# Patient Record
Sex: FEMALE | Race: WHITE | Employment: UNEMPLOYED | URBAN - METROPOLITAN AREA
[De-identification: names, ages, dates, MRNs, and addresses within clinical notes are randomized per-mention and may not be internally consistent; named-entity substitution may affect disease eponyms.]

---

## 2024-06-03 ENCOUNTER — HOSPITAL ENCOUNTER (OUTPATIENT)
Age: 39
Discharge: HOME | End: 2024-06-03

## 2024-06-04 ENCOUNTER — HOSPITAL ENCOUNTER (OUTPATIENT)
Dept: GENERAL RADIOLOGY | Age: 39
Discharge: HOME | End: 2024-06-04

## 2024-06-04 DIAGNOSIS — S99.921A INJURY OF TOE ON RIGHT FOOT, INITIAL ENCOUNTER: ICD-10-CM

## 2024-06-04 PROCEDURE — 73630 X-RAY EXAM OF FOOT: CPT

## 2024-06-11 RX ORDER — HYDROCHLOROTHIAZIDE 12.5 MG/1
12.5 TABLET ORAL DAILY
COMMUNITY

## 2024-06-11 RX ORDER — BUSPIRONE HYDROCHLORIDE 10 MG/1
10 TABLET ORAL 3 TIMES DAILY
COMMUNITY

## 2024-06-11 RX ORDER — HYDROCODONE BITARTRATE AND ACETAMINOPHEN 5; 325 MG/1; MG/1
1 TABLET ORAL EVERY 6 HOURS PRN
COMMUNITY

## 2024-06-14 ENCOUNTER — ANESTHESIA EVENT (OUTPATIENT)
Dept: OPERATING ROOM | Age: 39
End: 2024-06-14

## 2024-06-17 ENCOUNTER — HOSPITAL ENCOUNTER (OUTPATIENT)
Age: 39
Setting detail: OUTPATIENT SURGERY
Discharge: HOME | End: 2024-06-17
Attending: ORTHOPAEDIC SURGERY | Admitting: ORTHOPAEDIC SURGERY

## 2024-06-17 ENCOUNTER — ANESTHESIA (OUTPATIENT)
Dept: OPERATING ROOM | Age: 39
End: 2024-06-17

## 2024-06-17 ENCOUNTER — APPOINTMENT (OUTPATIENT)
Dept: GENERAL RADIOLOGY | Age: 39
End: 2024-06-17
Attending: ORTHOPAEDIC SURGERY

## 2024-06-17 VITALS
DIASTOLIC BLOOD PRESSURE: 72 MMHG | WEIGHT: 169 LBS | SYSTOLIC BLOOD PRESSURE: 118 MMHG | BODY MASS INDEX: 25.03 KG/M2 | HEIGHT: 69 IN | OXYGEN SATURATION: 100 % | HEART RATE: 58 BPM | RESPIRATION RATE: 13 BRPM | TEMPERATURE: 97.9 F

## 2024-06-17 PROBLEM — T84.84XA PAIN IN BONE FIXATION DEVICE, INITIAL ENCOUNTER (HCC): Status: ACTIVE | Noted: 2024-06-17

## 2024-06-17 LAB
ANION GAP SERPL CALCULATED.3IONS-SCNC: 10 MMOL/L (ref 3–16)
CALCIUM SERPL-MCNC: 8.7 MG/DL (ref 8.3–10.6)
CHLORIDE SERPL-SCNC: 105 MMOL/L (ref 99–110)
CO2 SERPL-SCNC: 21 MMOL/L (ref 21–32)
CREAT SERPL-MCNC: <0.5 MG/DL (ref 0.6–1.1)
GFR SERPLBLD CREATININE-BSD FMLA CKD-EPI: >90 ML/MIN/{1.73_M2}
GLUCOSE SERPL-MCNC: 92 MG/DL (ref 70–99)
HCG UR QL: NEGATIVE
POTASSIUM SERPL-SCNC: 3.8 MMOL/L (ref 3.5–5.1)
SODIUM SERPL-SCNC: 136 MMOL/L (ref 136–145)

## 2024-06-17 PROCEDURE — 3600000004 HC SURGERY LEVEL 4 BASE: Performed by: ORTHOPAEDIC SURGERY

## 2024-06-17 PROCEDURE — 7100000011 HC PHASE II RECOVERY - ADDTL 15 MIN: Performed by: ORTHOPAEDIC SURGERY

## 2024-06-17 PROCEDURE — 7100000000 HC PACU RECOVERY - FIRST 15 MIN: Performed by: ORTHOPAEDIC SURGERY

## 2024-06-17 PROCEDURE — 6360000002 HC RX W HCPCS: Performed by: ORTHOPAEDIC SURGERY

## 2024-06-17 PROCEDURE — 80048 BASIC METABOLIC PNL TOTAL CA: CPT

## 2024-06-17 PROCEDURE — 6370000000 HC RX 637 (ALT 250 FOR IP): Performed by: ANESTHESIOLOGY

## 2024-06-17 PROCEDURE — 6360000002 HC RX W HCPCS: Performed by: NURSE ANESTHETIST, CERTIFIED REGISTERED

## 2024-06-17 PROCEDURE — 7100000010 HC PHASE II RECOVERY - FIRST 15 MIN: Performed by: ORTHOPAEDIC SURGERY

## 2024-06-17 PROCEDURE — 3700000000 HC ANESTHESIA ATTENDED CARE: Performed by: ORTHOPAEDIC SURGERY

## 2024-06-17 PROCEDURE — 73630 X-RAY EXAM OF FOOT: CPT

## 2024-06-17 PROCEDURE — 84703 CHORIONIC GONADOTROPIN ASSAY: CPT

## 2024-06-17 PROCEDURE — 2580000003 HC RX 258: Performed by: ORTHOPAEDIC SURGERY

## 2024-06-17 PROCEDURE — 7100000001 HC PACU RECOVERY - ADDTL 15 MIN: Performed by: ORTHOPAEDIC SURGERY

## 2024-06-17 PROCEDURE — 2709999900 HC NON-CHARGEABLE SUPPLY: Performed by: ORTHOPAEDIC SURGERY

## 2024-06-17 PROCEDURE — 3600000014 HC SURGERY LEVEL 4 ADDTL 15MIN: Performed by: ORTHOPAEDIC SURGERY

## 2024-06-17 PROCEDURE — 2580000003 HC RX 258: Performed by: ANESTHESIOLOGY

## 2024-06-17 PROCEDURE — 3700000001 HC ADD 15 MINUTES (ANESTHESIA): Performed by: ORTHOPAEDIC SURGERY

## 2024-06-17 PROCEDURE — 2500000003 HC RX 250 WO HCPCS: Performed by: NURSE ANESTHETIST, CERTIFIED REGISTERED

## 2024-06-17 PROCEDURE — A4217 STERILE WATER/SALINE, 500 ML: HCPCS | Performed by: ORTHOPAEDIC SURGERY

## 2024-06-17 PROCEDURE — 6360000002 HC RX W HCPCS: Performed by: ANESTHESIOLOGY

## 2024-06-17 RX ORDER — NALOXONE HYDROCHLORIDE 0.4 MG/ML
INJECTION, SOLUTION INTRAMUSCULAR; INTRAVENOUS; SUBCUTANEOUS PRN
Status: DISCONTINUED | OUTPATIENT
Start: 2024-06-17 | End: 2024-06-17 | Stop reason: HOSPADM

## 2024-06-17 RX ORDER — SODIUM CHLORIDE 0.9 % (FLUSH) 0.9 %
5-40 SYRINGE (ML) INJECTION PRN
Status: DISCONTINUED | OUTPATIENT
Start: 2024-06-17 | End: 2024-06-17 | Stop reason: HOSPADM

## 2024-06-17 RX ORDER — DEXAMETHASONE SODIUM PHOSPHATE 4 MG/ML
INJECTION, SOLUTION INTRA-ARTICULAR; INTRALESIONAL; INTRAMUSCULAR; INTRAVENOUS; SOFT TISSUE PRN
Status: DISCONTINUED | OUTPATIENT
Start: 2024-06-17 | End: 2024-06-17 | Stop reason: SDUPTHER

## 2024-06-17 RX ORDER — FENTANYL CITRATE 50 UG/ML
INJECTION, SOLUTION INTRAMUSCULAR; INTRAVENOUS PRN
Status: DISCONTINUED | OUTPATIENT
Start: 2024-06-17 | End: 2024-06-17 | Stop reason: SDUPTHER

## 2024-06-17 RX ORDER — SODIUM CHLORIDE 0.9 % (FLUSH) 0.9 %
5-40 SYRINGE (ML) INJECTION EVERY 12 HOURS SCHEDULED
Status: DISCONTINUED | OUTPATIENT
Start: 2024-06-17 | End: 2024-06-17 | Stop reason: HOSPADM

## 2024-06-17 RX ORDER — PROCHLORPERAZINE EDISYLATE 5 MG/ML
5 INJECTION INTRAMUSCULAR; INTRAVENOUS
Status: DISCONTINUED | OUTPATIENT
Start: 2024-06-17 | End: 2024-06-17 | Stop reason: HOSPADM

## 2024-06-17 RX ORDER — OXYCODONE HYDROCHLORIDE 5 MG/1
5 TABLET ORAL
Status: DISCONTINUED | OUTPATIENT
Start: 2024-06-17 | End: 2024-06-17 | Stop reason: HOSPADM

## 2024-06-17 RX ORDER — MIDAZOLAM HYDROCHLORIDE 1 MG/ML
INJECTION INTRAMUSCULAR; INTRAVENOUS PRN
Status: DISCONTINUED | OUTPATIENT
Start: 2024-06-17 | End: 2024-06-17 | Stop reason: SDUPTHER

## 2024-06-17 RX ORDER — SODIUM CHLORIDE 9 MG/ML
INJECTION, SOLUTION INTRAVENOUS PRN
Status: DISCONTINUED | OUTPATIENT
Start: 2024-06-17 | End: 2024-06-17 | Stop reason: HOSPADM

## 2024-06-17 RX ORDER — TRAMADOL HYDROCHLORIDE 50 MG/1
100 TABLET ORAL ONCE
Status: COMPLETED | OUTPATIENT
Start: 2024-06-17 | End: 2024-06-17

## 2024-06-17 RX ORDER — IPRATROPIUM BROMIDE AND ALBUTEROL SULFATE 2.5; .5 MG/3ML; MG/3ML
1 SOLUTION RESPIRATORY (INHALATION)
Status: DISCONTINUED | OUTPATIENT
Start: 2024-06-17 | End: 2024-06-17 | Stop reason: HOSPADM

## 2024-06-17 RX ORDER — MAGNESIUM HYDROXIDE 1200 MG/15ML
LIQUID ORAL CONTINUOUS PRN
Status: COMPLETED | OUTPATIENT
Start: 2024-06-17 | End: 2024-06-17

## 2024-06-17 RX ORDER — MEPERIDINE HYDROCHLORIDE 50 MG/ML
12.5 INJECTION INTRAMUSCULAR; INTRAVENOUS; SUBCUTANEOUS EVERY 5 MIN PRN
Status: DISCONTINUED | OUTPATIENT
Start: 2024-06-17 | End: 2024-06-17 | Stop reason: HOSPADM

## 2024-06-17 RX ORDER — PROPOFOL 10 MG/ML
INJECTION, EMULSION INTRAVENOUS PRN
Status: DISCONTINUED | OUTPATIENT
Start: 2024-06-17 | End: 2024-06-17 | Stop reason: SDUPTHER

## 2024-06-17 RX ORDER — OXYCODONE HYDROCHLORIDE 5 MG/1
10 TABLET ORAL PRN
Status: DISCONTINUED | OUTPATIENT
Start: 2024-06-17 | End: 2024-06-17 | Stop reason: HOSPADM

## 2024-06-17 RX ORDER — ONDANSETRON 2 MG/ML
INJECTION INTRAMUSCULAR; INTRAVENOUS PRN
Status: DISCONTINUED | OUTPATIENT
Start: 2024-06-17 | End: 2024-06-17 | Stop reason: SDUPTHER

## 2024-06-17 RX ORDER — LIDOCAINE HYDROCHLORIDE 10 MG/ML
0.3 INJECTION, SOLUTION EPIDURAL; INFILTRATION; INTRACAUDAL; PERINEURAL
Status: DISCONTINUED | OUTPATIENT
Start: 2024-06-17 | End: 2024-06-17 | Stop reason: HOSPADM

## 2024-06-17 RX ORDER — LIDOCAINE HYDROCHLORIDE 20 MG/ML
INJECTION, SOLUTION EPIDURAL; INFILTRATION; INTRACAUDAL; PERINEURAL PRN
Status: DISCONTINUED | OUTPATIENT
Start: 2024-06-17 | End: 2024-06-17 | Stop reason: SDUPTHER

## 2024-06-17 RX ORDER — CLINDAMYCIN PHOSPHATE 900 MG/50ML
900 INJECTION, SOLUTION INTRAVENOUS
Status: COMPLETED | OUTPATIENT
Start: 2024-06-17 | End: 2024-06-17

## 2024-06-17 RX ORDER — BUPIVACAINE HYDROCHLORIDE 5 MG/ML
INJECTION, SOLUTION EPIDURAL; INTRACAUDAL PRN
Status: DISCONTINUED | OUTPATIENT
Start: 2024-06-17 | End: 2024-06-17 | Stop reason: ALTCHOICE

## 2024-06-17 RX ORDER — SODIUM CHLORIDE, SODIUM LACTATE, POTASSIUM CHLORIDE, CALCIUM CHLORIDE 600; 310; 30; 20 MG/100ML; MG/100ML; MG/100ML; MG/100ML
INJECTION, SOLUTION INTRAVENOUS CONTINUOUS
Status: DISCONTINUED | OUTPATIENT
Start: 2024-06-17 | End: 2024-06-17 | Stop reason: HOSPADM

## 2024-06-17 RX ORDER — ACETAMINOPHEN 500 MG
1000 TABLET ORAL ONCE
Status: COMPLETED | OUTPATIENT
Start: 2024-06-17 | End: 2024-06-17

## 2024-06-17 RX ORDER — ONDANSETRON 2 MG/ML
4 INJECTION INTRAMUSCULAR; INTRAVENOUS
Status: DISCONTINUED | OUTPATIENT
Start: 2024-06-17 | End: 2024-06-17 | Stop reason: HOSPADM

## 2024-06-17 RX ADMIN — ACETAMINOPHEN 1000 MG: 500 TABLET ORAL at 12:37

## 2024-06-17 RX ADMIN — MIDAZOLAM 2 MG: 1 INJECTION INTRAMUSCULAR; INTRAVENOUS at 12:52

## 2024-06-17 RX ADMIN — TRAMADOL HYDROCHLORIDE 100 MG: 50 TABLET ORAL at 12:36

## 2024-06-17 RX ADMIN — LIDOCAINE HYDROCHLORIDE 80 MG: 20 INJECTION, SOLUTION EPIDURAL; INFILTRATION; INTRACAUDAL; PERINEURAL at 12:58

## 2024-06-17 RX ADMIN — HYDROMORPHONE HYDROCHLORIDE 0.5 MG: 1 INJECTION, SOLUTION INTRAMUSCULAR; INTRAVENOUS; SUBCUTANEOUS at 13:54

## 2024-06-17 RX ADMIN — DEXAMETHASONE SODIUM PHOSPHATE 4 MG: 4 INJECTION, SOLUTION INTRAMUSCULAR; INTRAVENOUS at 13:11

## 2024-06-17 RX ADMIN — OXYCODONE 10 MG: 5 TABLET ORAL at 14:12

## 2024-06-17 RX ADMIN — FENTANYL CITRATE 50 MCG: 50 INJECTION, SOLUTION INTRAMUSCULAR; INTRAVENOUS at 13:27

## 2024-06-17 RX ADMIN — HYDROMORPHONE HYDROCHLORIDE 0.5 MG: 1 INJECTION, SOLUTION INTRAMUSCULAR; INTRAVENOUS; SUBCUTANEOUS at 14:21

## 2024-06-17 RX ADMIN — PROPOFOL 200 MG: 10 INJECTION, EMULSION INTRAVENOUS at 12:58

## 2024-06-17 RX ADMIN — FENTANYL CITRATE 50 MCG: 50 INJECTION, SOLUTION INTRAMUSCULAR; INTRAVENOUS at 13:08

## 2024-06-17 RX ADMIN — HYDROMORPHONE HYDROCHLORIDE 0.5 MG: 1 INJECTION, SOLUTION INTRAMUSCULAR; INTRAVENOUS; SUBCUTANEOUS at 14:05

## 2024-06-17 RX ADMIN — SODIUM CHLORIDE, SODIUM LACTATE, POTASSIUM CHLORIDE, AND CALCIUM CHLORIDE: .6; .31; .03; .02 INJECTION, SOLUTION INTRAVENOUS at 12:25

## 2024-06-17 RX ADMIN — ONDANSETRON 4 MG: 2 INJECTION INTRAMUSCULAR; INTRAVENOUS at 13:11

## 2024-06-17 RX ADMIN — Medication 2 AMPULE: at 12:30

## 2024-06-17 RX ADMIN — CLINDAMYCIN PHOSPHATE 900 MG: 900 INJECTION, SOLUTION INTRAVENOUS at 13:00

## 2024-06-17 ASSESSMENT — PAIN SCALES - GENERAL
PAINLEVEL_OUTOF10: 8
PAINLEVEL_OUTOF10: 8
PAINLEVEL_OUTOF10: 9
PAINLEVEL_OUTOF10: 9

## 2024-06-17 ASSESSMENT — PAIN DESCRIPTION - ORIENTATION
ORIENTATION: RIGHT

## 2024-06-17 ASSESSMENT — PAIN DESCRIPTION - LOCATION
LOCATION: FOOT

## 2024-06-17 ASSESSMENT — LIFESTYLE VARIABLES: SMOKING_STATUS: 1

## 2024-06-17 NOTE — DISCHARGE INSTRUCTIONS
Ice and elevate involved lower extremity  Heel Wt bearing involved lower extremity with boot on  Keep clean and dry  Meds:take pain meds and antibiotics as directed. Both were called into pharmacy from the office on 6/14/24  Take 325 mg of aspirin daily as long as you have no problems with your stomach  Follow up in 10-14 days with Dr. Gupta. Call for appt [phone]  Loosen Ace PRN  Can have shoe off when sitting but must wear when walking and in bed.  Call for temp > 101.0 shortness of breath or uncontrolled pain

## 2024-06-17 NOTE — BRIEF OP NOTE
Brief Postoperative Note      Patient: Jania Patel  YOB: 1985  MRN: [id number]    Date of Procedure: 6/17/2024    Pre-Op Diagnosis Codes:     * Pain in bone fixation device, initial encounter ([facility]) [T84.84XA]    Post-Op Diagnosis: Same       Procedure(s):  RIGHT MIDFOOT HARDWARE REMOVAL    Surgeon(s):  Lalo Gupta MD    Assistant:  Surgical Assistant: Lalo Guerra RN  Physician Assistant: Tello Al PA-C    Anesthesia: General    Estimated Blood Loss (mL): Minimal    Complications: None    Specimens:   * No specimens in log *    Implants:  * No implants in log *      Drains: * No LDAs found *    Findings:  Infection Present At Time Of Surgery (PATOS) (choose all levels that have infection present):  No infection present  Other Findings: no great toe new fracture    Electronically signed by Lalo Gupta MD on 6/17/2024 at 1:28 PM

## 2024-06-17 NOTE — ANESTHESIA PRE PROCEDURE
Started at age 12      Vital Signs (Current):   Vitals:    06/11/24 1624   Weight: 76.7 kg (169 lb)   Height: 1.753 m (5' 9\")                                              BP Readings from Last 3 Encounters:   01/15/23 118/66   09/21/22 130/84   03/08/21 106/67       NPO Status:                                                                                 BMI:   Wt Readings from Last 3 Encounters:   06/11/24 76.7 kg (169 lb)   01/15/23 77.1 kg (170 lb)   09/21/22 82 kg (180 lb 12.8 oz)     Body mass index is 24.96 kg/m².    CBC:   Lab Results   Component Value Date/Time    WBC 3.9 09/21/2022 04:32 AM    RBC 2.94 09/21/2022 04:32 AM    HGB 9.7 09/21/2022 04:32 AM    HCT 28.9 09/21/2022 04:32 AM    MCV 98.5 09/21/2022 04:32 AM    RDW 16.0 09/21/2022 04:32 AM     09/21/2022 04:32 AM       CMP:   Lab Results   Component Value Date/Time     09/21/2022 04:32 AM    K 3.6 09/21/2022 04:32 AM     09/21/2022 04:32 AM    CO2 21 09/21/2022 04:32 AM    BUN 7 09/21/2022 04:32 AM    CREATININE <0.5 09/21/2022 04:32 AM    GFRAA >60 09/21/2022 04:32 AM    AGRATIO 1.3 09/21/2022 04:32 AM    LABGLOM >60 09/21/2022 04:32 AM    GLUCOSE 99 09/21/2022 04:32 AM    CALCIUM 7.9 09/21/2022 04:32 AM    BILITOT 0.3 09/21/2022 04:32 AM    ALKPHOS 45 09/21/2022 04:32 AM    AST 13 09/21/2022 04:32 AM    ALT 8 09/21/2022 04:32 AM       POC Tests: No results for input(s): \"POCGLU\", \"POCNA\", \"POCK\", \"POCCL\", \"POCBUN\", \"POCHEMO\", \"POCHCT\" in the last 72 hours.    Coags:   Lab Results   Component Value Date/Time    PROTIME 10.6 08/12/2021 05:06 PM    INR 0.94 08/12/2021 05:06 PM       HCG (If Applicable):   Lab Results   Component Value Date    PREGTESTUR Negative 06/17/2024    PREGSERUM Negative 09/20/2022    HCGQUANT <5.0 09/21/2022        ABGs: No results found for: \"PHART\", \"PO2ART\", \"OON4IYA\", \"ZRR3YWS\", \"BEART\", \"N9LPGANA\"     Type & Screen (If Applicable):  No results found for: \"LABABO\"    Drug/Infectious Status (If

## 2024-06-17 NOTE — PROGRESS NOTES
Patient admitted to [facility] room 3 in preparation for surgery, VSS. Consent confirmed. IV inserted into left hand, LR infusing. Belongings on [facility] cart. NPO since 2000. Family at bedside, phone number in system for text updates, call light within reach.    
Pt brought to PACU. Report obtained from OR RN and anesthesia. Pt placed on monitor and O2 at  2L   
Pt up to the bathroom to void without difficulty. Discharge instructions given to pt and family. Verbalized understanding. PIV removed. Pt dressed and wheeled out and discharged to the care of their family in stable condition.    
_________________________  (__) C-REACTIVE PROTEIN ___________    (__) VITAMIN D HYDROXY ___________  (__) BETABLOCKER  _________________                                                                                       (__) ACE/ARBS:__________________________    (__) GLP-1 Agonist ___________________                Ride home/Contact #_______________________   (__) SCLT2 inhibitor ___________________         
going to the OR; we will provide a container.  If you wear contact lenses or glasses, they will be removed,               bring a case for them or wear glasses day of surgery.             - Please see your family doctor/pediatrician for a Preop History & Physical and/or concerning medications within 30 days of the surgery date.                  Non-[facility] physicians can fax H&P/test results to [phone]. You may need cardiac clearance if you see a cardiologist, check with PCP or surgeon.              -If you have a Living Will and Durable Power of  for Healthcare, please bring in a copy to be scanned at registration.              -Notify your Surgeon if you develop any illness between now and the surgery date, cough, cold, fever, sore throat, nausea, vomiting, etc.  Please notify your                surgeon if you experience dizziness, shortness of breath or blurred vision between now & the time of your surgery.              -DO NOT shave your operative site less than 4 days prior to surgery. For face & neck surgery, men may use an electric razor up to 2 days prior to surgery.   -To help prevent infection, change your sheets the night before surgery. Also, shower the night before & morning of surgery using an antibacterial     soap (Dial or Safeguard) or Hibiclens soap as instructed by your surgeon. Do not apply lotion after shower or day of surgery.              -To provide excellent care visitors will be limited to two per room at any given time.              -Please bring your picture ID and insurance card for registration prior to arriving to second floor surgery department.              -If you use a CPAP/BiPAP please bring with you on the day of the surgery. If you use oxygen, please bring portable tank with you.              -For your convenience [facility] has an outpatient pharmacy on site to fill your prescriptions prior to 5 pm.              -Bring a complete list of all your medications with name and

## 2024-06-17 NOTE — ANESTHESIA POSTPROCEDURE EVALUATION
Department of Anesthesiology  Postprocedure Note    Patient: Jania Patel  MRN: [id number]  YOB: 1985  Date of evaluation: 6/17/2024    Procedure Summary       Date: 06/17/24 Room / Location: [address]    Anesthesia Start: 1254 Anesthesia Stop: 1355    Procedure: RIGHT MIDFOOT HARDWARE REMOVAL (Right: Foot) Diagnosis:       Pain in bone fixation device, initial encounter ([facility])      (Pain in bone fixation device, initial encounter ([facility]) [T84.84XA])    Surgeons: Lalo Gupta MD Responsible Provider: Garry Kimball MD    Anesthesia Type: general ASA Status: 2            Anesthesia Type: No value filed.    Sudhir Phase I: Sudhir Score: 10    Sudhir Phase II:      Anesthesia Post Evaluation    Patient location during evaluation: PACU  Patient participation: complete - patient participated  Level of consciousness: awake and alert  Airway patency: patent  Nausea & Vomiting: no nausea and no vomiting  Cardiovascular status: hemodynamically stable  Respiratory status: acceptable  Hydration status: euvolemic  Pain management: adequate    No notable events documented.

## 2024-06-18 NOTE — OP NOTE
[address] [address]                            OPERATIVE REPORT      PATIENT NAME: NADJA ELLIS            : 1985  MED REC NO: [id number]                      ROOM: [facility]  ACCOUNT NO: [id number]                       ADMIT DATE: 2024  PROVIDER: Lalo Gupta MD      DATE OF PROCEDURE:  2024    SURGEON:  Lalo Gupta MD    PREOPERATIVE DIAGNOSIS:  Retained painful orthopedic hardware, right foot.    POSTOPERATIVE DIAGNOSIS:  Retained painful orthopedic hardware, right foot.    OPERATION PERFORMED:  Removal of painful hardware, right foot.    ASSISTANT SURGEON:  DOT Yañez    DRAINS:  None.    TUBES:  None.    SPECIMENS:  None.    ESTIMATED BLOOD LOSS:  Less than 25 mL.    TOURNIQUET TIME:  Less than 30 minutes.    INDICATIONS:  The patient is a 39-year-old female who had previous Lisfranc injury, underwent bridged stapling and internal screw fixation of her Lisfranc fracture dislocation.  She would like her hardware removed.  The staples were broken.  She understands that the tines that are in the bone would not be removed.  Risks and benefits of surgery were explained to the patient.  Informed consent was signed in the chart prior to surgery.    DESCRIPTION OF PROCEDURE:  Patient was brought to the operating room and after adequate general anesthesia and block, was placed in supine position.  A nonsterile tourniquet was placed on the proximal aspect of her right upper thigh.  Her right lower extremity prepped and draped in sterile fashion.  Leg was exsanguinated and tourniquet inflated to 300 mmHg.  At this point, a transverse incision was made over the medial aspect of the foot through her previous medial incision.  It was carried down through the subcutaneous tissue using Metzenbaum scissors.  The plantar medial staple was easily identified, cleaned of soft tissue with care taken not to damage the dorsal

## 2024-12-11 ENCOUNTER — APPOINTMENT (OUTPATIENT)
Dept: GENERAL RADIOLOGY | Age: 39
End: 2024-12-11
Payer: MEDICAID

## 2024-12-11 ENCOUNTER — ANCILLARY PROCEDURE (OUTPATIENT)
Dept: CARDIOLOGY CLINIC | Age: 39
End: 2024-12-11

## 2024-12-11 ENCOUNTER — HOSPITAL ENCOUNTER (EMERGENCY)
Age: 39
Discharge: HOME | End: 2024-12-11
Attending: STUDENT IN AN ORGANIZED HEALTH CARE EDUCATION/TRAINING PROGRAM
Payer: MEDICAID

## 2024-12-11 ENCOUNTER — APPOINTMENT (OUTPATIENT)
Dept: CT IMAGING | Age: 39
End: 2024-12-11
Attending: STUDENT IN AN ORGANIZED HEALTH CARE EDUCATION/TRAINING PROGRAM
Payer: MEDICAID

## 2024-12-11 VITALS
SYSTOLIC BLOOD PRESSURE: 110 MMHG | OXYGEN SATURATION: 100 % | DIASTOLIC BLOOD PRESSURE: 70 MMHG | HEART RATE: 51 BPM | TEMPERATURE: 98.8 F | RESPIRATION RATE: 14 BRPM | WEIGHT: 162.8 LBS | BODY MASS INDEX: 24.04 KG/M2

## 2024-12-11 DIAGNOSIS — R55 PRE-SYNCOPE: ICD-10-CM

## 2024-12-11 DIAGNOSIS — R07.9 CHEST PAIN, UNSPECIFIED TYPE: Primary | ICD-10-CM

## 2024-12-11 DIAGNOSIS — R00.2 PALPITATIONS: ICD-10-CM

## 2024-12-11 DIAGNOSIS — R42 LIGHTHEADED: ICD-10-CM

## 2024-12-11 LAB
ALBUMIN SERPL-MCNC: 3.9 G/DL (ref 3.4–5)
ALBUMIN/GLOB SERPL: 1.6 {RATIO} (ref 1.1–2.2)
ALP SERPL-CCNC: 39 U/L (ref 40–129)
ALT SERPL-CCNC: 9 U/L (ref 10–40)
ANION GAP SERPL CALCULATED.3IONS-SCNC: 12 MMOL/L (ref 3–16)
AST SERPL-CCNC: 16 U/L (ref 15–37)
BASOPHILS # BLD: 0 K/UL (ref 0–0.2)
BASOPHILS NFR BLD: 0.6 %
BILIRUB SERPL-MCNC: 0.7 MG/DL (ref 0–1)
BUN SERPL-MCNC: 12 MG/DL (ref 7–20)
CALCIUM SERPL-MCNC: 8.8 MG/DL (ref 8.3–10.6)
CHLORIDE SERPL-SCNC: 109 MMOL/L (ref 99–110)
CO2 SERPL-SCNC: 19 MMOL/L (ref 21–32)
CREAT SERPL-MCNC: 0.8 MG/DL (ref 0.6–1.1)
DEPRECATED RDW RBC AUTO: 14.8 % (ref 12.4–15.4)
EKG ATRIAL RATE: 57 BPM
EKG DIAGNOSIS: NORMAL
EKG P AXIS: 39 DEGREES
EKG P-R INTERVAL: 102 MS
EKG Q-T INTERVAL: 436 MS
EKG QRS DURATION: 88 MS
EKG QTC CALCULATION (BAZETT): 424 MS
EKG R AXIS: 68 DEGREES
EKG T AXIS: 53 DEGREES
EKG VENTRICULAR RATE: 57 BPM
EOSINOPHIL # BLD: 0.2 K/UL (ref 0–0.6)
EOSINOPHIL NFR BLD: 2.2 %
ETHANOLAMINE SERPL-MCNC: NORMAL MG/DL (ref 0–0.08)
GFR SERPLBLD CREATININE-BSD FMLA CKD-EPI: >90 ML/MIN/{1.73_M2}
GLUCOSE SERPL-MCNC: 87 MG/DL (ref 70–99)
HCG SERPL QL: NEGATIVE
HCT VFR BLD AUTO: 44.5 % (ref 36–48)
HGB BLD-MCNC: 14.3 G/DL (ref 12–16)
LIPASE SERPL-CCNC: 30 U/L (ref 13–60)
LYMPHOCYTES # BLD: 2.1 K/UL (ref 1–5.1)
LYMPHOCYTES NFR BLD: 28.5 %
MCH RBC QN AUTO: 32.7 PG (ref 26–34)
MCHC RBC AUTO-ENTMCNC: 32.2 G/DL (ref 31–36)
MCV RBC AUTO: 101.7 FL (ref 80–100)
MONOCYTES # BLD: 0.4 K/UL (ref 0–1.3)
MONOCYTES NFR BLD: 6 %
NEUTROPHILS # BLD: 4.7 K/UL (ref 1.7–7.7)
NEUTROPHILS NFR BLD: 62.7 %
PLATELET # BLD AUTO: 200 K/UL (ref 135–450)
PMV BLD AUTO: 8.3 FL (ref 5–10.5)
POTASSIUM SERPL-SCNC: 4.3 MMOL/L (ref 3.5–5.1)
PROT SERPL-MCNC: 6.4 G/DL (ref 6.4–8.2)
RBC # BLD AUTO: 4.38 M/UL (ref 4–5.2)
SODIUM SERPL-SCNC: 140 MMOL/L (ref 136–145)
TROPONIN, HIGH SENSITIVITY: <6 NG/L (ref 0–14)
TROPONIN, HIGH SENSITIVITY: <6 NG/L (ref 0–14)
WBC # BLD AUTO: 7.4 K/UL (ref 4–11)

## 2024-12-11 PROCEDURE — 6360000004 HC RX CONTRAST MEDICATION: Performed by: STUDENT IN AN ORGANIZED HEALTH CARE EDUCATION/TRAINING PROGRAM

## 2024-12-11 PROCEDURE — 96374 THER/PROPH/DIAG INJ IV PUSH: CPT

## 2024-12-11 PROCEDURE — 93010 ELECTROCARDIOGRAM REPORT: CPT | Performed by: INTERNAL MEDICINE

## 2024-12-11 PROCEDURE — 84703 CHORIONIC GONADOTROPIN ASSAY: CPT

## 2024-12-11 PROCEDURE — 80053 COMPREHEN METABOLIC PANEL: CPT

## 2024-12-11 PROCEDURE — 2580000003 HC RX 258: Performed by: STUDENT IN AN ORGANIZED HEALTH CARE EDUCATION/TRAINING PROGRAM

## 2024-12-11 PROCEDURE — 99285 EMERGENCY DEPT VISIT HI MDM: CPT

## 2024-12-11 PROCEDURE — 6360000002 HC RX W HCPCS: Performed by: STUDENT IN AN ORGANIZED HEALTH CARE EDUCATION/TRAINING PROGRAM

## 2024-12-11 PROCEDURE — 96375 TX/PRO/DX INJ NEW DRUG ADDON: CPT

## 2024-12-11 PROCEDURE — 93005 ELECTROCARDIOGRAM TRACING: CPT | Performed by: STUDENT IN AN ORGANIZED HEALTH CARE EDUCATION/TRAINING PROGRAM

## 2024-12-11 PROCEDURE — 84484 ASSAY OF TROPONIN QUANT: CPT

## 2024-12-11 PROCEDURE — 71260 CT THORAX DX C+: CPT

## 2024-12-11 PROCEDURE — 82077 ASSAY SPEC XCP UR&BREATH IA: CPT

## 2024-12-11 PROCEDURE — 83690 ASSAY OF LIPASE: CPT

## 2024-12-11 PROCEDURE — 85025 COMPLETE CBC W/AUTO DIFF WBC: CPT

## 2024-12-11 PROCEDURE — 96361 HYDRATE IV INFUSION ADD-ON: CPT

## 2024-12-11 PROCEDURE — 74177 CT ABD & PELVIS W/CONTRAST: CPT

## 2024-12-11 PROCEDURE — 36415 COLL VENOUS BLD VENIPUNCTURE: CPT

## 2024-12-11 PROCEDURE — 71046 X-RAY EXAM CHEST 2 VIEWS: CPT

## 2024-12-11 RX ORDER — IOPAMIDOL 755 MG/ML
75 INJECTION, SOLUTION INTRAVASCULAR
Status: COMPLETED | OUTPATIENT
Start: 2024-12-11 | End: 2024-12-11

## 2024-12-11 RX ORDER — ONDANSETRON 2 MG/ML
4 INJECTION INTRAMUSCULAR; INTRAVENOUS ONCE
Status: COMPLETED | OUTPATIENT
Start: 2024-12-11 | End: 2024-12-11

## 2024-12-11 RX ORDER — 0.9 % SODIUM CHLORIDE 0.9 %
1000 INTRAVENOUS SOLUTION INTRAVENOUS ONCE
Status: COMPLETED | OUTPATIENT
Start: 2024-12-11 | End: 2024-12-11

## 2024-12-11 RX ORDER — MORPHINE SULFATE 4 MG/ML
4 INJECTION, SOLUTION INTRAMUSCULAR; INTRAVENOUS ONCE
Status: COMPLETED | OUTPATIENT
Start: 2024-12-11 | End: 2024-12-11

## 2024-12-11 RX ADMIN — IOPAMIDOL 75 ML: 755 INJECTION, SOLUTION INTRAVENOUS at 12:14

## 2024-12-11 RX ADMIN — MORPHINE SULFATE 4 MG: 4 INJECTION, SOLUTION INTRAMUSCULAR; INTRAVENOUS at 11:04

## 2024-12-11 RX ADMIN — ONDANSETRON 4 MG: 2 INJECTION, SOLUTION INTRAMUSCULAR; INTRAVENOUS at 11:03

## 2024-12-11 RX ADMIN — SODIUM CHLORIDE 1000 ML: 9 INJECTION, SOLUTION INTRAVENOUS at 11:03

## 2024-12-11 ASSESSMENT — ENCOUNTER SYMPTOMS
ABDOMINAL PAIN: 1
SHORTNESS OF BREATH: 1
NAUSEA: 1
VOMITING: 1

## 2024-12-11 ASSESSMENT — PAIN - FUNCTIONAL ASSESSMENT
PAIN_FUNCTIONAL_ASSESSMENT: 0-10
PAIN_FUNCTIONAL_ASSESSMENT: NONE - DENIES PAIN

## 2024-12-11 ASSESSMENT — PAIN DESCRIPTION - LOCATION
LOCATION: CHEST
LOCATION: CHEST

## 2024-12-11 ASSESSMENT — PAIN DESCRIPTION - PAIN TYPE: TYPE: ACUTE PAIN

## 2024-12-11 ASSESSMENT — PAIN SCALES - GENERAL
PAINLEVEL_OUTOF10: 6
PAINLEVEL_OUTOF10: 6

## 2024-12-11 ASSESSMENT — PAIN DESCRIPTION - DESCRIPTORS
DESCRIPTORS: DISCOMFORT
DESCRIPTORS: DULL;SHARP

## 2024-12-11 ASSESSMENT — PAIN DESCRIPTION - ORIENTATION: ORIENTATION: MID

## 2024-12-11 NOTE — ED PROVIDER NOTES
she will need to come back to get one placed tomorrow.  She is amenable with plan    - Return precautions also discussed.  patient verbalized understanding of care plan and agreed to follow-up with cardiology as advised.          Clinical Impression:  1. Chest pain, unspecified type    2. Pre-syncope    3. Lightheaded    4. Palpitations          Disposition:  Discharge to home in good condition.    Blood pressure 104/63, pulse 56, temperature 98.8 °F (37.1 °C), temperature source Oral, resp. rate 14, weight 73.8 kg (162 lb 12.8 oz), SpO2 100%, unknown if currently breastfeeding.    Patient was given scripts for the following medications. I counseled patient how to take these medications.   New Prescriptions    No medications on file       Disposition referral (if applicable):  [facility]  [address] [facility] [address] [phone]        Kyleigh Hedrick, APRN - [facility]  [address]  [address]  [facility] [address]  [phone]              Total critical care time is 0 minutes, which excludes separately billable procedures and updating family. Time spent is specifically for management of the presenting complaint and symptoms initially, direct bedside care, reevaluation, review of records, and consultation.  There was a high probability of clinically significant life-threatening deterioration in the patient's condition, which required my urgent intervention.     This chart was generated in part by using Dragon Dictation system and may contain errors related to that system including errors in grammar, punctuation, and spelling, as well as words and phrases that may be inappropriate. If there are any questions or concerns please feel free to contact the dictating provider for clarification.     Jennifer Golden MD   [employer]        Jennifer Golden MD  12/11/24 2863

## 2024-12-17 ENCOUNTER — TELEPHONE (OUTPATIENT)
Dept: CARDIOLOGY CLINIC | Age: 39
End: 2024-12-17

## 2024-12-17 ENCOUNTER — ANCILLARY PROCEDURE (OUTPATIENT)
Dept: CARDIOLOGY CLINIC | Age: 39
End: 2024-12-17

## 2024-12-17 DIAGNOSIS — R00.2 PALPITATIONS: ICD-10-CM

## 2024-12-17 DIAGNOSIS — R55 SYNCOPE, UNSPECIFIED SYNCOPE TYPE: Primary | ICD-10-CM

## 2024-12-17 DIAGNOSIS — R55 SYNCOPE, UNSPECIFIED SYNCOPE TYPE: ICD-10-CM

## 2024-12-17 PROCEDURE — 93270 REMOTE 30 DAY ECG REV/REPORT: CPT | Performed by: INTERNAL MEDICINE

## 2024-12-17 NOTE — TELEPHONE ENCOUNTER
Monitor placed by Al/SM  Monitor company [employer]  Length of monitor 2 DAY  Monitor ordered by TYREL PRATER MD  Serial number [id number]  Patch ID [id number]  Activation successful prior to pt leaving office? Yes

## 2024-12-27 PROCEDURE — 93272 ECG/REVIEW INTERPRET ONLY: CPT | Performed by: INTERNAL MEDICINE

## 2025-06-12 ENCOUNTER — PREP FOR PROCEDURE (OUTPATIENT)
Dept: OBGYN | Age: 40
End: 2025-06-12

## 2025-06-12 RX ORDER — CELECOXIB 100 MG/1
400 CAPSULE ORAL ONCE
Status: CANCELLED | OUTPATIENT
Start: 2025-06-12

## 2025-06-12 RX ORDER — SCOPOLAMINE 1 MG/3D
1 PATCH, EXTENDED RELEASE TRANSDERMAL
Status: CANCELLED | OUTPATIENT
Start: 2025-06-12

## 2025-06-12 RX ORDER — SODIUM CHLORIDE 9 MG/ML
INJECTION, SOLUTION INTRAVENOUS PRN
Status: CANCELLED | OUTPATIENT
Start: 2025-06-12

## 2025-06-12 RX ORDER — SODIUM CHLORIDE 0.9 % (FLUSH) 0.9 %
5-40 SYRINGE (ML) INJECTION EVERY 12 HOURS SCHEDULED
Status: CANCELLED | OUTPATIENT
Start: 2025-06-12

## 2025-06-12 RX ORDER — SODIUM CHLORIDE, SODIUM LACTATE, POTASSIUM CHLORIDE, CALCIUM CHLORIDE 600; 310; 30; 20 MG/100ML; MG/100ML; MG/100ML; MG/100ML
INJECTION, SOLUTION INTRAVENOUS CONTINUOUS
Status: CANCELLED | OUTPATIENT
Start: 2025-06-12

## 2025-06-12 RX ORDER — SODIUM CHLORIDE 0.9 % (FLUSH) 0.9 %
5-40 SYRINGE (ML) INJECTION PRN
Status: CANCELLED | OUTPATIENT
Start: 2025-06-12

## 2025-06-12 RX ORDER — GABAPENTIN 100 MG/1
300 CAPSULE ORAL DAILY
Status: CANCELLED | OUTPATIENT
Start: 2025-06-12

## 2025-06-12 NOTE — H&P (VIEW-ONLY)
Patient: Jania Patel    YOB: 1985   Birth Sex: Female   Date: 2025 02:30 PM   Visit Type: Office Visit - GYN   This 40 year old patient presents for preop.      History of Present Illness:  1.  preop   Menses: Started 1.5 years ago but is getting worse, Abnormal Twice a month sometimes, severe pain with menses and a couple of days before in LLQ sharp, stabbing, and dull 10/10 pain, vomiting with the pain, bloating, lower back pain, heavy menses occasionally, pain in between periods as well  aggregated by: Menses, sex  relieved by: Tylenol helps for a little bit   Previously done DEPOT made menses worse   FH: Pt had melanoma 3 times, MGM breast cancer age 40s, Colon cancer Father 76,   Ever had diagnostic lap: No  PSH: none on abdomen       Primary dysmenorrhea: no   Pain with sex:  yes   Pain with bowel movements: No   Issues with urination: no   TVUS: 10 cm uterus with complex 2 cm cyst likely hemorraghic or endometrioma. tumor markers negative,   Meds: ompeprasole, water pill and buspar    Desires to proceed with RATLH/BS/Left oophorectomy/cystoscopy              Gynecologic History  Patient is premenopausal. Last menses was 2025.   2025 02:30 PM  Date of last Pap: 2024.    Obstetric History  Not currently pregnant.   Past Systemic History    Medical History  (Detailed)  Disease Onset Date Comments   T/C Scoliosis 2009    Melonoma R upper arm 2005    blood in stool     alcohol relapse     Incomplete  at 7 weeks     Missed      non compressive disc buldging L4-L5     Elective  x1     HX of Right Breast Mass - Biopsy Benign 2016     Acute COVID-19     melanoma right forehead         Surgical History/Management  (Detailed)  Management Date Comments   xray read by chriopractor     Removed     Suction D&C 2018    D&C 2001    MRI     Removal of painful hardware in right foot 2024    Right foot surgery 2020

## 2025-06-19 NOTE — PROGRESS NOTES

## 2025-06-23 ENCOUNTER — ANESTHESIA EVENT (OUTPATIENT)
Dept: OPERATING ROOM | Age: 40
DRG: 742 | End: 2025-06-23
Payer: COMMERCIAL

## 2025-07-07 ENCOUNTER — APPOINTMENT (OUTPATIENT)
Dept: INTERVENTIONAL RADIOLOGY/VASCULAR | Age: 40
DRG: 742 | End: 2025-07-07
Attending: STUDENT IN AN ORGANIZED HEALTH CARE EDUCATION/TRAINING PROGRAM
Payer: COMMERCIAL

## 2025-07-07 ENCOUNTER — HOSPITAL ENCOUNTER (INPATIENT)
Age: 40
LOS: 2 days | Discharge: HOME | DRG: 742 | End: 2025-07-10
Attending: STUDENT IN AN ORGANIZED HEALTH CARE EDUCATION/TRAINING PROGRAM | Admitting: STUDENT IN AN ORGANIZED HEALTH CARE EDUCATION/TRAINING PROGRAM
Payer: COMMERCIAL

## 2025-07-07 ENCOUNTER — HOSPITAL ENCOUNTER (OUTPATIENT)
Age: 40
Setting detail: OBSERVATION
Discharge: STILL ADMITTED OR ADMINISTRATIVE | DRG: 742 | End: 2025-07-07
Attending: STUDENT IN AN ORGANIZED HEALTH CARE EDUCATION/TRAINING PROGRAM | Admitting: STUDENT IN AN ORGANIZED HEALTH CARE EDUCATION/TRAINING PROGRAM
Payer: COMMERCIAL

## 2025-07-07 ENCOUNTER — ANESTHESIA (OUTPATIENT)
Dept: OPERATING ROOM | Age: 40
DRG: 742 | End: 2025-07-07
Payer: COMMERCIAL

## 2025-07-07 VITALS
WEIGHT: 161 LBS | DIASTOLIC BLOOD PRESSURE: 71 MMHG | RESPIRATION RATE: 16 BRPM | BODY MASS INDEX: 23.85 KG/M2 | SYSTOLIC BLOOD PRESSURE: 117 MMHG | HEIGHT: 69 IN | TEMPERATURE: 97.7 F | OXYGEN SATURATION: 98 % | HEART RATE: 80 BPM

## 2025-07-07 DIAGNOSIS — N93.9 ABNORMAL UTERINE BLEEDING: ICD-10-CM

## 2025-07-07 DIAGNOSIS — Z90.710 S/P LAPAROSCOPIC HYSTERECTOMY: Primary | ICD-10-CM

## 2025-07-07 DIAGNOSIS — S37.10XA RIGHT URETERAL INJURY, INITIAL ENCOUNTER: ICD-10-CM

## 2025-07-07 LAB
ABO/RH: NORMAL
ANION GAP SERPL CALCULATED.3IONS-SCNC: 12 MMOL/L (ref 3–16)
ANTIBODY SCREEN: NORMAL
BUN SERPL-MCNC: 14 MG/DL (ref 7–20)
CALCIUM SERPL-MCNC: 8.9 MG/DL (ref 8.3–10.6)
CHLORIDE SERPL-SCNC: 106 MMOL/L (ref 99–110)
CO2 SERPL-SCNC: 20 MMOL/L (ref 21–32)
CREAT SERPL-MCNC: 0.5 MG/DL (ref 0.6–1.1)
DEPRECATED RDW RBC AUTO: 13.5 % (ref 12.4–15.4)
GFR SERPLBLD CREATININE-BSD FMLA CKD-EPI: >90 ML/MIN/{1.73_M2}
GLUCOSE SERPL-MCNC: 86 MG/DL (ref 70–99)
HCG UR QL: NEGATIVE
HCT VFR BLD AUTO: 40.5 % (ref 36–48)
HGB BLD-MCNC: 13.9 G/DL (ref 12–16)
MCH RBC QN AUTO: 32.8 PG (ref 26–34)
MCHC RBC AUTO-ENTMCNC: 34.3 G/DL (ref 31–36)
MCV RBC AUTO: 95.7 FL (ref 80–100)
PLATELET # BLD AUTO: 201 K/UL (ref 135–450)
PMV BLD AUTO: 8.1 FL (ref 5–10.5)
POTASSIUM SERPL-SCNC: 4 MMOL/L (ref 3.5–5.1)
RBC # BLD AUTO: 4.24 M/UL (ref 4–5.2)
SODIUM SERPL-SCNC: 138 MMOL/L (ref 136–145)
WBC # BLD AUTO: 6.3 K/UL (ref 4–11)

## 2025-07-07 PROCEDURE — 6360000002 HC RX W HCPCS: Performed by: NURSE ANESTHETIST, CERTIFIED REGISTERED

## 2025-07-07 PROCEDURE — 3700000001 HC ADD 15 MINUTES (ANESTHESIA): Performed by: STUDENT IN AN ORGANIZED HEALTH CARE EDUCATION/TRAINING PROGRAM

## 2025-07-07 PROCEDURE — 0UDB7ZZ EXTRACTION OF ENDOMETRIUM, VIA NATURAL OR ARTIFICIAL OPENING: ICD-10-PCS | Performed by: STUDENT IN AN ORGANIZED HEALTH CARE EDUCATION/TRAINING PROGRAM

## 2025-07-07 PROCEDURE — 2720000010 HC SURG SUPPLY STERILE: Performed by: STUDENT IN AN ORGANIZED HEALTH CARE EDUCATION/TRAINING PROGRAM

## 2025-07-07 PROCEDURE — 6370000000 HC RX 637 (ALT 250 FOR IP): Performed by: STUDENT IN AN ORGANIZED HEALTH CARE EDUCATION/TRAINING PROGRAM

## 2025-07-07 PROCEDURE — 86901 BLOOD TYPING SEROLOGIC RH(D): CPT

## 2025-07-07 PROCEDURE — 2500000003 HC RX 250 WO HCPCS: Performed by: ANESTHESIOLOGY

## 2025-07-07 PROCEDURE — 6360000002 HC RX W HCPCS

## 2025-07-07 PROCEDURE — 36415 COLL VENOUS BLD VENIPUNCTURE: CPT

## 2025-07-07 PROCEDURE — S2900 ROBOTIC SURGICAL SYSTEM: HCPCS | Performed by: STUDENT IN AN ORGANIZED HEALTH CARE EDUCATION/TRAINING PROGRAM

## 2025-07-07 PROCEDURE — 2580000003 HC RX 258: Performed by: STUDENT IN AN ORGANIZED HEALTH CARE EDUCATION/TRAINING PROGRAM

## 2025-07-07 PROCEDURE — 88342 IMHCHEM/IMCYTCHM 1ST ANTB: CPT

## 2025-07-07 PROCEDURE — 6360000002 HC RX W HCPCS: Performed by: STUDENT IN AN ORGANIZED HEALTH CARE EDUCATION/TRAINING PROGRAM

## 2025-07-07 PROCEDURE — 84703 CHORIONIC GONADOTROPIN ASSAY: CPT

## 2025-07-07 PROCEDURE — G0378 HOSPITAL OBSERVATION PER HR: HCPCS

## 2025-07-07 PROCEDURE — C1758 CATHETER, URETERAL: HCPCS | Performed by: STUDENT IN AN ORGANIZED HEALTH CARE EDUCATION/TRAINING PROGRAM

## 2025-07-07 PROCEDURE — 2709999900 HC NON-CHARGEABLE SUPPLY: Performed by: STUDENT IN AN ORGANIZED HEALTH CARE EDUCATION/TRAINING PROGRAM

## 2025-07-07 PROCEDURE — 3600000009 HC SURGERY ROBOT BASE: Performed by: STUDENT IN AN ORGANIZED HEALTH CARE EDUCATION/TRAINING PROGRAM

## 2025-07-07 PROCEDURE — 2580000003 HC RX 258: Performed by: ANESTHESIOLOGY

## 2025-07-07 PROCEDURE — 85027 COMPLETE CBC AUTOMATED: CPT

## 2025-07-07 PROCEDURE — 88307 TISSUE EXAM BY PATHOLOGIST: CPT

## 2025-07-07 PROCEDURE — C1765 ADHESION BARRIER: HCPCS | Performed by: STUDENT IN AN ORGANIZED HEALTH CARE EDUCATION/TRAINING PROGRAM

## 2025-07-07 PROCEDURE — 3600000019 HC SURGERY ROBOT ADDTL 15MIN: Performed by: STUDENT IN AN ORGANIZED HEALTH CARE EDUCATION/TRAINING PROGRAM

## 2025-07-07 PROCEDURE — 3700000000 HC ANESTHESIA ATTENDED CARE: Performed by: STUDENT IN AN ORGANIZED HEALTH CARE EDUCATION/TRAINING PROGRAM

## 2025-07-07 PROCEDURE — 0UT94ZZ RESECTION OF UTERUS, PERCUTANEOUS ENDOSCOPIC APPROACH: ICD-10-PCS | Performed by: UROLOGY

## 2025-07-07 PROCEDURE — 2500000003 HC RX 250 WO HCPCS: Performed by: NURSE ANESTHETIST, CERTIFIED REGISTERED

## 2025-07-07 PROCEDURE — 80048 BASIC METABOLIC PNL TOTAL CA: CPT

## 2025-07-07 PROCEDURE — C1769 GUIDE WIRE: HCPCS

## 2025-07-07 PROCEDURE — 0UT14ZZ RESECTION OF LEFT OVARY, PERCUTANEOUS ENDOSCOPIC APPROACH: ICD-10-PCS | Performed by: STUDENT IN AN ORGANIZED HEALTH CARE EDUCATION/TRAINING PROGRAM

## 2025-07-07 PROCEDURE — 6360000002 HC RX W HCPCS: Performed by: ANESTHESIOLOGY

## 2025-07-07 PROCEDURE — 50432 PLMT NEPHROSTOMY CATHETER: CPT

## 2025-07-07 PROCEDURE — 7100000000 HC PACU RECOVERY - FIRST 15 MIN: Performed by: STUDENT IN AN ORGANIZED HEALTH CARE EDUCATION/TRAINING PROGRAM

## 2025-07-07 PROCEDURE — 7100000001 HC PACU RECOVERY - ADDTL 15 MIN: Performed by: STUDENT IN AN ORGANIZED HEALTH CARE EDUCATION/TRAINING PROGRAM

## 2025-07-07 PROCEDURE — 86850 RBC ANTIBODY SCREEN: CPT

## 2025-07-07 PROCEDURE — C1769 GUIDE WIRE: HCPCS | Performed by: STUDENT IN AN ORGANIZED HEALTH CARE EDUCATION/TRAINING PROGRAM

## 2025-07-07 PROCEDURE — 2500000003 HC RX 250 WO HCPCS: Performed by: STUDENT IN AN ORGANIZED HEALTH CARE EDUCATION/TRAINING PROGRAM

## 2025-07-07 PROCEDURE — 88305 TISSUE EXAM BY PATHOLOGIST: CPT

## 2025-07-07 PROCEDURE — 0UT74ZZ RESECTION OF BILATERAL FALLOPIAN TUBES, PERCUTANEOUS ENDOSCOPIC APPROACH: ICD-10-PCS | Performed by: UROLOGY

## 2025-07-07 PROCEDURE — 86900 BLOOD TYPING SEROLOGIC ABO: CPT

## 2025-07-07 RX ORDER — SODIUM CHLORIDE 0.9 % (FLUSH) 0.9 %
5-40 SYRINGE (ML) INJECTION EVERY 12 HOURS SCHEDULED
Status: DISCONTINUED | OUTPATIENT
Start: 2025-07-07 | End: 2025-07-07 | Stop reason: HOSPADM

## 2025-07-07 RX ORDER — SODIUM CHLORIDE 0.9 % (FLUSH) 0.9 %
5-40 SYRINGE (ML) INJECTION PRN
Status: DISCONTINUED | OUTPATIENT
Start: 2025-07-07 | End: 2025-07-10 | Stop reason: HOSPADM

## 2025-07-07 RX ORDER — BUSPIRONE HYDROCHLORIDE 10 MG/1
10 TABLET ORAL 3 TIMES DAILY
Status: CANCELLED | OUTPATIENT
Start: 2025-07-07

## 2025-07-07 RX ORDER — ACETAMINOPHEN 500 MG
1000 TABLET ORAL EVERY 8 HOURS SCHEDULED
Status: DISCONTINUED | OUTPATIENT
Start: 2025-07-07 | End: 2025-07-07

## 2025-07-07 RX ORDER — ONDANSETRON 2 MG/ML
4 INJECTION INTRAMUSCULAR; INTRAVENOUS
Status: DISCONTINUED | OUTPATIENT
Start: 2025-07-07 | End: 2025-07-07 | Stop reason: HOSPADM

## 2025-07-07 RX ORDER — OXYCODONE HYDROCHLORIDE 5 MG/1
5 TABLET ORAL PRN
Status: DISCONTINUED | OUTPATIENT
Start: 2025-07-07 | End: 2025-07-07 | Stop reason: HOSPADM

## 2025-07-07 RX ORDER — CELECOXIB 200 MG/1
400 CAPSULE ORAL ONCE
Status: COMPLETED | OUTPATIENT
Start: 2025-07-07 | End: 2025-07-07

## 2025-07-07 RX ORDER — SODIUM CHLORIDE 9 MG/ML
INJECTION, SOLUTION INTRAVENOUS PRN
Status: DISCONTINUED | OUTPATIENT
Start: 2025-07-07 | End: 2025-07-07 | Stop reason: HOSPADM

## 2025-07-07 RX ORDER — FAMOTIDINE 10 MG/ML
20 INJECTION, SOLUTION INTRAVENOUS 2 TIMES DAILY
Status: DISCONTINUED | OUTPATIENT
Start: 2025-07-07 | End: 2025-07-10 | Stop reason: HOSPADM

## 2025-07-07 RX ORDER — DOCUSATE SODIUM 100 MG/1
100 CAPSULE, LIQUID FILLED ORAL 2 TIMES DAILY
Status: DISCONTINUED | OUTPATIENT
Start: 2025-07-07 | End: 2025-07-09 | Stop reason: SDUPTHER

## 2025-07-07 RX ORDER — SODIUM CHLORIDE 0.9 % (FLUSH) 0.9 %
5-40 SYRINGE (ML) INJECTION PRN
Status: DISCONTINUED | OUTPATIENT
Start: 2025-07-07 | End: 2025-07-07 | Stop reason: HOSPADM

## 2025-07-07 RX ORDER — FAMOTIDINE 20 MG/1
20 TABLET, FILM COATED ORAL 2 TIMES DAILY
Status: DISCONTINUED | OUTPATIENT
Start: 2025-07-07 | End: 2025-07-10 | Stop reason: HOSPADM

## 2025-07-07 RX ORDER — SODIUM CHLORIDE, SODIUM LACTATE, POTASSIUM CHLORIDE, CALCIUM CHLORIDE 600; 310; 30; 20 MG/100ML; MG/100ML; MG/100ML; MG/100ML
INJECTION, SOLUTION INTRAVENOUS CONTINUOUS
Status: DISCONTINUED | OUTPATIENT
Start: 2025-07-07 | End: 2025-07-07 | Stop reason: SDUPTHER

## 2025-07-07 RX ORDER — HYDROMORPHONE HYDROCHLORIDE 2 MG/ML
INJECTION, SOLUTION INTRAMUSCULAR; INTRAVENOUS; SUBCUTANEOUS
Status: DISCONTINUED | OUTPATIENT
Start: 2025-07-07 | End: 2025-07-07 | Stop reason: SDUPTHER

## 2025-07-07 RX ORDER — SODIUM CHLORIDE, SODIUM LACTATE, POTASSIUM CHLORIDE, CALCIUM CHLORIDE 600; 310; 30; 20 MG/100ML; MG/100ML; MG/100ML; MG/100ML
INJECTION, SOLUTION INTRAVENOUS CONTINUOUS
Status: DISCONTINUED | OUTPATIENT
Start: 2025-07-07 | End: 2025-07-07 | Stop reason: HOSPADM

## 2025-07-07 RX ORDER — SODIUM CHLORIDE 0.9 % (FLUSH) 0.9 %
5-40 SYRINGE (ML) INJECTION PRN
Status: DISCONTINUED | OUTPATIENT
Start: 2025-07-07 | End: 2025-07-07 | Stop reason: SDUPTHER

## 2025-07-07 RX ORDER — IBUPROFEN 800 MG/1
800 TABLET, FILM COATED ORAL EVERY 8 HOURS
Status: DISCONTINUED | OUTPATIENT
Start: 2025-07-08 | End: 2025-07-08

## 2025-07-07 RX ORDER — IBUPROFEN 800 MG/1
800 TABLET, FILM COATED ORAL EVERY 8 HOURS
Status: DISCONTINUED | OUTPATIENT
Start: 2025-07-08 | End: 2025-07-07 | Stop reason: HOSPADM

## 2025-07-07 RX ORDER — FENTANYL CITRATE 50 UG/ML
INJECTION, SOLUTION INTRAMUSCULAR; INTRAVENOUS
Status: DISCONTINUED | OUTPATIENT
Start: 2025-07-07 | End: 2025-07-07 | Stop reason: SDUPTHER

## 2025-07-07 RX ORDER — ONDANSETRON 2 MG/ML
INJECTION INTRAMUSCULAR; INTRAVENOUS
Status: DISCONTINUED | OUTPATIENT
Start: 2025-07-07 | End: 2025-07-07 | Stop reason: SDUPTHER

## 2025-07-07 RX ORDER — ONDANSETRON 2 MG/ML
4 INJECTION INTRAMUSCULAR; INTRAVENOUS EVERY 6 HOURS PRN
Status: DISCONTINUED | OUTPATIENT
Start: 2025-07-07 | End: 2025-07-07 | Stop reason: HOSPADM

## 2025-07-07 RX ORDER — KETOROLAC TROMETHAMINE 30 MG/ML
30 INJECTION, SOLUTION INTRAMUSCULAR; INTRAVENOUS EVERY 6 HOURS
Status: DISCONTINUED | OUTPATIENT
Start: 2025-07-07 | End: 2025-07-07 | Stop reason: HOSPADM

## 2025-07-07 RX ORDER — LIDOCAINE HYDROCHLORIDE 20 MG/ML
INJECTION, SOLUTION INFILTRATION; PERINEURAL
Status: DISCONTINUED | OUTPATIENT
Start: 2025-07-07 | End: 2025-07-07 | Stop reason: SDUPTHER

## 2025-07-07 RX ORDER — DEXAMETHASONE SODIUM PHOSPHATE 4 MG/ML
INJECTION, SOLUTION INTRA-ARTICULAR; INTRALESIONAL; INTRAMUSCULAR; INTRAVENOUS; SOFT TISSUE
Status: DISCONTINUED | OUTPATIENT
Start: 2025-07-07 | End: 2025-07-07 | Stop reason: SDUPTHER

## 2025-07-07 RX ORDER — KETOROLAC TROMETHAMINE 30 MG/ML
30 INJECTION, SOLUTION INTRAMUSCULAR; INTRAVENOUS EVERY 6 HOURS
Status: COMPLETED | OUTPATIENT
Start: 2025-07-07 | End: 2025-07-08

## 2025-07-07 RX ORDER — MAGNESIUM HYDROXIDE 1200 MG/15ML
LIQUID ORAL CONTINUOUS PRN
Status: COMPLETED | OUTPATIENT
Start: 2025-07-07 | End: 2025-07-07

## 2025-07-07 RX ORDER — SODIUM CHLORIDE 9 MG/ML
INJECTION, SOLUTION INTRAVENOUS PRN
Status: DISCONTINUED | OUTPATIENT
Start: 2025-07-07 | End: 2025-07-10 | Stop reason: HOSPADM

## 2025-07-07 RX ORDER — SODIUM CHLORIDE 9 MG/ML
INJECTION, SOLUTION INTRAVENOUS PRN
Status: DISCONTINUED | OUTPATIENT
Start: 2025-07-07 | End: 2025-07-07 | Stop reason: SDUPTHER

## 2025-07-07 RX ORDER — FAMOTIDINE 20 MG/1
20 TABLET, FILM COATED ORAL 2 TIMES DAILY
Status: DISCONTINUED | OUTPATIENT
Start: 2025-07-07 | End: 2025-07-07 | Stop reason: HOSPADM

## 2025-07-07 RX ORDER — SODIUM CHLORIDE, SODIUM LACTATE, POTASSIUM CHLORIDE, CALCIUM CHLORIDE 600; 310; 30; 20 MG/100ML; MG/100ML; MG/100ML; MG/100ML
INJECTION, SOLUTION INTRAVENOUS CONTINUOUS
Status: DISCONTINUED | OUTPATIENT
Start: 2025-07-07 | End: 2025-07-10

## 2025-07-07 RX ORDER — OXYCODONE AND ACETAMINOPHEN 5; 325 MG/1; MG/1
1 TABLET ORAL EVERY 6 HOURS PRN
Refills: 0 | Status: DISCONTINUED | OUTPATIENT
Start: 2025-07-07 | End: 2025-07-08

## 2025-07-07 RX ORDER — GABAPENTIN 300 MG/1
300 CAPSULE ORAL DAILY
Status: DISCONTINUED | OUTPATIENT
Start: 2025-07-07 | End: 2025-07-07 | Stop reason: HOSPADM

## 2025-07-07 RX ORDER — MORPHINE SULFATE 2 MG/ML
2 INJECTION, SOLUTION INTRAMUSCULAR; INTRAVENOUS ONCE
Status: COMPLETED | OUTPATIENT
Start: 2025-07-07 | End: 2025-07-07

## 2025-07-07 RX ORDER — OXYCODONE HYDROCHLORIDE 5 MG/1
10 TABLET ORAL PRN
Status: DISCONTINUED | OUTPATIENT
Start: 2025-07-07 | End: 2025-07-07 | Stop reason: HOSPADM

## 2025-07-07 RX ORDER — ROCURONIUM BROMIDE 10 MG/ML
INJECTION, SOLUTION INTRAVENOUS
Status: DISCONTINUED | OUTPATIENT
Start: 2025-07-07 | End: 2025-07-07 | Stop reason: SDUPTHER

## 2025-07-07 RX ORDER — HYDROCHLOROTHIAZIDE 25 MG/1
12.5 TABLET ORAL DAILY
Status: CANCELLED | OUTPATIENT
Start: 2025-07-08

## 2025-07-07 RX ORDER — SODIUM CHLORIDE 0.9 % (FLUSH) 0.9 %
5-40 SYRINGE (ML) INJECTION EVERY 12 HOURS SCHEDULED
Status: DISCONTINUED | OUTPATIENT
Start: 2025-07-07 | End: 2025-07-07 | Stop reason: SDUPTHER

## 2025-07-07 RX ORDER — MEPERIDINE HYDROCHLORIDE 25 MG/ML
12.5 INJECTION INTRAMUSCULAR; INTRAVENOUS; SUBCUTANEOUS EVERY 5 MIN PRN
Status: DISCONTINUED | OUTPATIENT
Start: 2025-07-07 | End: 2025-07-07 | Stop reason: HOSPADM

## 2025-07-07 RX ORDER — SODIUM CHLORIDE 0.9 % (FLUSH) 0.9 %
5-40 SYRINGE (ML) INJECTION EVERY 12 HOURS SCHEDULED
Status: DISCONTINUED | OUTPATIENT
Start: 2025-07-07 | End: 2025-07-10 | Stop reason: HOSPADM

## 2025-07-07 RX ORDER — ACETAMINOPHEN 500 MG
1000 TABLET ORAL EVERY 8 HOURS SCHEDULED
Status: DISCONTINUED | OUTPATIENT
Start: 2025-07-07 | End: 2025-07-07 | Stop reason: HOSPADM

## 2025-07-07 RX ORDER — MIDAZOLAM HYDROCHLORIDE 1 MG/ML
INJECTION, SOLUTION INTRAMUSCULAR; INTRAVENOUS
Status: DISCONTINUED | OUTPATIENT
Start: 2025-07-07 | End: 2025-07-07 | Stop reason: SDUPTHER

## 2025-07-07 RX ORDER — SCOPOLAMINE 1 MG/3D
1 PATCH, EXTENDED RELEASE TRANSDERMAL
Status: DISCONTINUED | OUTPATIENT
Start: 2025-07-07 | End: 2025-07-07 | Stop reason: HOSPADM

## 2025-07-07 RX ORDER — PROCHLORPERAZINE EDISYLATE 5 MG/ML
10 INJECTION INTRAMUSCULAR; INTRAVENOUS EVERY 6 HOURS PRN
Status: DISCONTINUED | OUTPATIENT
Start: 2025-07-07 | End: 2025-07-10 | Stop reason: HOSPADM

## 2025-07-07 RX ORDER — ONDANSETRON 2 MG/ML
4 INJECTION INTRAMUSCULAR; INTRAVENOUS EVERY 6 HOURS PRN
Status: DISCONTINUED | OUTPATIENT
Start: 2025-07-07 | End: 2025-07-10 | Stop reason: HOSPADM

## 2025-07-07 RX ORDER — BUPIVACAINE HYDROCHLORIDE 2.5 MG/ML
INJECTION, SOLUTION INFILTRATION; PERINEURAL PRN
Status: DISCONTINUED | OUTPATIENT
Start: 2025-07-07 | End: 2025-07-07 | Stop reason: ALTCHOICE

## 2025-07-07 RX ORDER — OXYCODONE HYDROCHLORIDE 5 MG/1
5 TABLET ORAL EVERY 4 HOURS PRN
Status: DISCONTINUED | OUTPATIENT
Start: 2025-07-07 | End: 2025-07-07 | Stop reason: HOSPADM

## 2025-07-07 RX ORDER — ONDANSETRON 4 MG/1
4 TABLET, ORALLY DISINTEGRATING ORAL EVERY 8 HOURS PRN
Status: DISCONTINUED | OUTPATIENT
Start: 2025-07-07 | End: 2025-07-10 | Stop reason: HOSPADM

## 2025-07-07 RX ORDER — PROPOFOL 10 MG/ML
INJECTION, EMULSION INTRAVENOUS
Status: DISCONTINUED | OUTPATIENT
Start: 2025-07-07 | End: 2025-07-07 | Stop reason: SDUPTHER

## 2025-07-07 RX ORDER — OXYBUTYNIN CHLORIDE 5 MG/1
5 TABLET ORAL 3 TIMES DAILY
Status: DISCONTINUED | OUTPATIENT
Start: 2025-07-07 | End: 2025-07-10 | Stop reason: HOSPADM

## 2025-07-07 RX ORDER — INDOCYANINE GREEN AND WATER 25 MG
KIT INJECTION
Status: DISCONTINUED
Start: 2025-07-07 | End: 2025-07-07 | Stop reason: HOSPADM

## 2025-07-07 RX ORDER — OXYCODONE AND ACETAMINOPHEN 10; 325 MG/1; MG/1
1 TABLET ORAL EVERY 6 HOURS PRN
Refills: 0 | Status: DISCONTINUED | OUTPATIENT
Start: 2025-07-07 | End: 2025-07-08

## 2025-07-07 RX ORDER — PROCHLORPERAZINE EDISYLATE 5 MG/ML
10 INJECTION INTRAMUSCULAR; INTRAVENOUS EVERY 6 HOURS PRN
Status: DISCONTINUED | OUTPATIENT
Start: 2025-07-07 | End: 2025-07-07 | Stop reason: HOSPADM

## 2025-07-07 RX ORDER — ONDANSETRON 4 MG/1
4 TABLET, ORALLY DISINTEGRATING ORAL EVERY 8 HOURS PRN
Status: DISCONTINUED | OUTPATIENT
Start: 2025-07-07 | End: 2025-07-07 | Stop reason: HOSPADM

## 2025-07-07 RX ORDER — FENTANYL CITRATE 50 UG/ML
25 INJECTION, SOLUTION INTRAMUSCULAR; INTRAVENOUS EVERY 10 MIN PRN
Status: DISCONTINUED | OUTPATIENT
Start: 2025-07-07 | End: 2025-07-07 | Stop reason: HOSPADM

## 2025-07-07 RX ORDER — OXYCODONE HYDROCHLORIDE 5 MG/1
5 TABLET ORAL EVERY 4 HOURS PRN
Refills: 0 | Status: DISCONTINUED | OUTPATIENT
Start: 2025-07-07 | End: 2025-07-10 | Stop reason: HOSPADM

## 2025-07-07 RX ORDER — OXYCODONE HYDROCHLORIDE 5 MG/1
10 TABLET ORAL EVERY 4 HOURS PRN
Refills: 0 | Status: DISCONTINUED | OUTPATIENT
Start: 2025-07-07 | End: 2025-07-10 | Stop reason: HOSPADM

## 2025-07-07 RX ORDER — DOCUSATE SODIUM 100 MG/1
100 CAPSULE, LIQUID FILLED ORAL 2 TIMES DAILY
Status: DISCONTINUED | OUTPATIENT
Start: 2025-07-07 | End: 2025-07-07 | Stop reason: HOSPADM

## 2025-07-07 RX ORDER — FENTANYL CITRATE 50 UG/ML
INJECTION, SOLUTION INTRAMUSCULAR; INTRAVENOUS
Status: COMPLETED
Start: 2025-07-07 | End: 2025-07-07

## 2025-07-07 RX ORDER — FAMOTIDINE 10 MG/ML
20 INJECTION, SOLUTION INTRAVENOUS 2 TIMES DAILY
Status: DISCONTINUED | OUTPATIENT
Start: 2025-07-07 | End: 2025-07-07 | Stop reason: HOSPADM

## 2025-07-07 RX ADMIN — MORPHINE SULFATE 2 MG: 2 INJECTION, SOLUTION INTRAMUSCULAR; INTRAVENOUS at 23:07

## 2025-07-07 RX ADMIN — ROCURONIUM BROMIDE 50 MG: 10 INJECTION, SOLUTION INTRAVENOUS at 10:34

## 2025-07-07 RX ADMIN — SUGAMMADEX 200 MG: 100 INJECTION, SOLUTION INTRAVENOUS at 14:53

## 2025-07-07 RX ADMIN — GABAPENTIN 300 MG: 300 CAPSULE ORAL at 08:22

## 2025-07-07 RX ADMIN — ACETAMINOPHEN 1000 MG: 500 TABLET ORAL at 20:42

## 2025-07-07 RX ADMIN — CELECOXIB 400 MG: 200 CAPSULE ORAL at 08:21

## 2025-07-07 RX ADMIN — METHYLENE BLUE 25 MG: 5 INJECTION INTRAVENOUS at 12:07

## 2025-07-07 RX ADMIN — PROPOFOL 150 MG: 10 INJECTION, EMULSION INTRAVENOUS at 10:34

## 2025-07-07 RX ADMIN — SODIUM CHLORIDE, POTASSIUM CHLORIDE, SODIUM LACTATE AND CALCIUM CHLORIDE: 600; 310; 30; 20 INJECTION, SOLUTION INTRAVENOUS at 10:28

## 2025-07-07 RX ADMIN — FAMOTIDINE 20 MG: 10 INJECTION, SOLUTION INTRAVENOUS at 08:25

## 2025-07-07 RX ADMIN — FENTANYL CITRATE 100 MCG: 50 INJECTION INTRAMUSCULAR; INTRAVENOUS at 10:32

## 2025-07-07 RX ADMIN — HYDROMORPHONE HYDROCHLORIDE 1 MG: 2 INJECTION, SOLUTION INTRAMUSCULAR; INTRAVENOUS; SUBCUTANEOUS at 11:09

## 2025-07-07 RX ADMIN — ROCURONIUM BROMIDE 20 MG: 10 INJECTION, SOLUTION INTRAVENOUS at 11:18

## 2025-07-07 RX ADMIN — ONDANSETRON 4 MG: 2 INJECTION INTRAMUSCULAR; INTRAVENOUS at 10:44

## 2025-07-07 RX ADMIN — ROCURONIUM BROMIDE 30 MG: 10 INJECTION, SOLUTION INTRAVENOUS at 12:17

## 2025-07-07 RX ADMIN — SODIUM CHLORIDE, POTASSIUM CHLORIDE, SODIUM LACTATE AND CALCIUM CHLORIDE: 600; 310; 30; 20 INJECTION, SOLUTION INTRAVENOUS at 14:12

## 2025-07-07 RX ADMIN — ROCURONIUM BROMIDE 10 MG: 10 INJECTION, SOLUTION INTRAVENOUS at 14:09

## 2025-07-07 RX ADMIN — HYDROMORPHONE HYDROCHLORIDE 0.5 MG: 1 INJECTION, SOLUTION INTRAMUSCULAR; INTRAVENOUS; SUBCUTANEOUS at 15:11

## 2025-07-07 RX ADMIN — KETOROLAC TROMETHAMINE 30 MG: 30 INJECTION, SOLUTION INTRAMUSCULAR at 19:28

## 2025-07-07 RX ADMIN — HYDROMORPHONE HYDROCHLORIDE 0.5 MG: 1 INJECTION, SOLUTION INTRAMUSCULAR; INTRAVENOUS; SUBCUTANEOUS at 15:40

## 2025-07-07 RX ADMIN — OXYBUTYNIN CHLORIDE 5 MG: 5 TABLET ORAL at 21:40

## 2025-07-07 RX ADMIN — FENTANYL CITRATE 25 MCG: 50 INJECTION, SOLUTION INTRAMUSCULAR; INTRAVENOUS at 16:34

## 2025-07-07 RX ADMIN — SODIUM CHLORIDE 1000 MG: 9 INJECTION, SOLUTION INTRAVENOUS at 14:39

## 2025-07-07 RX ADMIN — HYDROMORPHONE HYDROCHLORIDE 0.5 MG: 1 INJECTION, SOLUTION INTRAMUSCULAR; INTRAVENOUS; SUBCUTANEOUS at 16:01

## 2025-07-07 RX ADMIN — FAMOTIDINE 20 MG: 10 INJECTION, SOLUTION INTRAVENOUS at 20:42

## 2025-07-07 RX ADMIN — MIDAZOLAM 2 MG: 1 INJECTION INTRAMUSCULAR; INTRAVENOUS at 10:30

## 2025-07-07 RX ADMIN — OXYCODONE 10 MG: 5 TABLET ORAL at 19:29

## 2025-07-07 RX ADMIN — HYDROMORPHONE HYDROCHLORIDE 1 MG: 2 INJECTION, SOLUTION INTRAMUSCULAR; INTRAVENOUS; SUBCUTANEOUS at 11:18

## 2025-07-07 RX ADMIN — DEXAMETHASONE SODIUM PHOSPHATE 4 MG: 4 INJECTION, SOLUTION INTRAMUSCULAR; INTRAVENOUS at 10:44

## 2025-07-07 RX ADMIN — SODIUM CHLORIDE 2000 MG: 9 INJECTION, SOLUTION INTRAVENOUS at 10:45

## 2025-07-07 RX ADMIN — MORPHINE SULFATE 2 MG: 2 INJECTION, SOLUTION INTRAMUSCULAR; INTRAVENOUS at 21:39

## 2025-07-07 RX ADMIN — SODIUM CHLORIDE, SODIUM LACTATE, POTASSIUM CHLORIDE, AND CALCIUM CHLORIDE: .6; .31; .03; .02 INJECTION, SOLUTION INTRAVENOUS at 17:26

## 2025-07-07 RX ADMIN — HYDROMORPHONE HYDROCHLORIDE 0.5 MG: 1 INJECTION, SOLUTION INTRAMUSCULAR; INTRAVENOUS; SUBCUTANEOUS at 15:19

## 2025-07-07 RX ADMIN — FENTANYL CITRATE 25 MCG: 50 INJECTION, SOLUTION INTRAMUSCULAR; INTRAVENOUS at 16:45

## 2025-07-07 RX ADMIN — DOCUSATE SODIUM 100 MG: 100 CAPSULE, LIQUID FILLED ORAL at 20:42

## 2025-07-07 RX ADMIN — LIDOCAINE HYDROCHLORIDE 50 MG: 20 INJECTION, SOLUTION INFILTRATION; PERINEURAL at 10:34

## 2025-07-07 ASSESSMENT — PAIN SCALES - GENERAL
PAINLEVEL_OUTOF10: 8
PAINLEVEL_OUTOF10: 10
PAINLEVEL_OUTOF10: 8
PAINLEVEL_OUTOF10: 10
PAINLEVEL_OUTOF10: 3
PAINLEVEL_OUTOF10: 10
PAINLEVEL_OUTOF10: 8
PAINLEVEL_OUTOF10: 8

## 2025-07-07 ASSESSMENT — PAIN - FUNCTIONAL ASSESSMENT
PAIN_FUNCTIONAL_ASSESSMENT: ACTIVITIES ARE NOT PREVENTED
PAIN_FUNCTIONAL_ASSESSMENT: INTOLERABLE, UNABLE TO DO ANY ACTIVE OR PASSIVE ACTIVITIES
PAIN_FUNCTIONAL_ASSESSMENT: 0-10
PAIN_FUNCTIONAL_ASSESSMENT: ACTIVITIES ARE NOT PREVENTED

## 2025-07-07 ASSESSMENT — PAIN DESCRIPTION - PAIN TYPE: TYPE: ACUTE PAIN

## 2025-07-07 ASSESSMENT — PAIN DESCRIPTION - LOCATION
LOCATION: ABDOMEN;BACK
LOCATION: ABDOMEN
LOCATION: ABDOMEN;BACK
LOCATION: ABDOMEN

## 2025-07-07 ASSESSMENT — PAIN DESCRIPTION - FREQUENCY: FREQUENCY: CONTINUOUS

## 2025-07-07 ASSESSMENT — PAIN DESCRIPTION - DESCRIPTORS
DESCRIPTORS: DISCOMFORT
DESCRIPTORS: DISCOMFORT
DESCRIPTORS: PRESSURE;SHARP
DESCRIPTORS: ACHING
DESCRIPTORS: DISCOMFORT
DESCRIPTORS: PRESSURE

## 2025-07-07 ASSESSMENT — LIFESTYLE VARIABLES: SMOKING_STATUS: 1

## 2025-07-07 ASSESSMENT — ENCOUNTER SYMPTOMS: SHORTNESS OF BREATH: 0

## 2025-07-07 ASSESSMENT — PAIN DESCRIPTION - ONSET: ONSET: ON-GOING

## 2025-07-07 ASSESSMENT — PAIN DESCRIPTION - ORIENTATION
ORIENTATION: ANTERIOR
ORIENTATION: ANTERIOR

## 2025-07-07 NOTE — BRIEF OP NOTE
Brief Postoperative Note      Patient: Jania Patel  YOB: 1985  MRN: [id number]    Date of Procedure: 7/7/2025    Pre-Op Diagnosis Codes:      * Abnormal uterine bleeding [N93.9], left ureteral injury    Post-Op Diagnosis: Same       Procedure(s):  ROBOTIC ASSISTED TOTAL LAPAROSCOPIC HYSTERECTOMY, BILATERAL SALPINGECTOMY, LEFT OOPHRECTOMY, ENDOMETRIOSIS EXCISION, CYSTOSCOPY, left diagnostic ureteroscopy, attempted left stent placement.    Surgeon(s):  Chris Cortez MD Rousseau, Michael Blaise, MD Dearworth, Hannah, MD    Assistant:  Surgical Assistant: Mj Sotelo Debra    Anesthesia: General    Estimated Blood Loss (mL): Minimal    Complications: Other: Transected left ureter    Specimens:   ID Type Source Tests Collected by Time Destination   A : Right Posterior Cul De Sac Endometriosis Tissue Tissue SURGICAL PATHOLOGY Gabby Schwartz MD 7/7/2025 1112    B :  Tissue Tissue SURGICAL PATHOLOGY Gabby Schwartz MD 7/7/2025 1139        Implants:  * No implants in log *      Drains:   Closed/Suction Drain Left LLQ Bulb (Active)       Urinary Catheter 07/07/25 2 Way (Active)       [REMOVED] Urinary Catheter 07/07/25 2 Way (Removed)       Findings:  Infection Present At Time Of Surgery (PATOS) (choose all levels that have infection present):  No infection present  Other Findings: Transected left ureter    Electronically signed by Chris Cortez MD on 7/7/2025 at 2:33 PM

## 2025-07-07 NOTE — ANESTHESIA POSTPROCEDURE EVALUATION
Department of Anesthesiology  Postprocedure Note    Patient: Jania Patel  MRN: [id number]  YOB: 1985  Date of evaluation: 7/7/2025    Procedure Summary       Date: 07/07/25 Room / Location: [address]    Anesthesia Start: 1030 Anesthesia Stop: 1510    Procedure: ROBOTIC ASSISTED TOTAL LAPAROSCOPIC HYSTERECTOMY, BILATERAL SALPINGECTOMY, LEFT OOPHRECTOMY, ENDOMETRIOSIS EXCISION, CYSTOSCOPY (Bilateral: Uterus) Diagnosis:       Abnormal uterine bleeding      (Abnormal uterine bleeding [N93.9])    Surgeons: Gabby Schwartz MD Responsible Provider: Lonnie Aguayo MD    Anesthesia Type: general ASA Status: 3            Anesthesia Type: No value filed.    Sudhir Phase I: Sudhir Score: 10    Sudhir Phase II:      Anesthesia Post Evaluation    Patient location during evaluation: bedside  Patient participation: complete - patient participated  Level of consciousness: awake and alert  Airway patency: patent  Nausea & Vomiting: no nausea  Cardiovascular status: hemodynamically stable  Respiratory status: acceptable  Hydration status: euvolemic  Pain management: adequate      Vitals:    07/07/25 1640 07/07/25 1645 07/07/25 1650 07/07/25 1655   BP:  117/71     Pulse: 80 83 82 80   Resp: 18 21 19 16   Temp:       TempSrc:       SpO2: 99% 93% 96% 98%   Weight:       Height:          No notable events documented.

## 2025-07-07 NOTE — OR NURSING
Image guided nephrostomy Tube insertion left kidney completed. Dr. Chaidez placed 8 Malay 25 cm Argon Skater drain LOT # [id number] EXP 02/05/30   in the left kidney. Drain/tube secured at the 16 cm line with sutures.  Drain/tube dressing clean, dry, and intact. Pt tolerated procedure without any signs or symptoms of distress. Vital signs stable. Report called to Lucy FAY, PACU. Pt transported back to PACU in stable condition via bed by transport.     Medications  See anesthesia note  Vital Signs  Vitals:    07/07/25 0759   BP: 118/71   Pulse: 66   Resp: 17   Temp: 97.7 °F (36.5 °C)   SpO2: 100%    (vital signs in table format)

## 2025-07-07 NOTE — ANESTHESIA PRE PROCEDURE
administered.  Anesthetic plan and risks discussed with patient, spouse and mother.    Use of blood products discussed with patient whom consented to blood products.    Plan discussed with CRNA.                This pre-anesthesia assessment may be used as a history and physical.    DOS STAFF ADDENDUM:    Pt seen and examined, chart reviewed (including anesthesia, drug and allergy history).  No interval changes to history and physical examination.  Anesthetic plan, risks, benefits, alternatives, and personnel involved discussed with patient.  Patient verbalized an understanding and agrees to proceed.      Lonnie Aguayo MD  July 7, 2025  9:17 AM    Lonnie Aguayo MD   7/7/2025

## 2025-07-07 NOTE — INTERVAL H&P NOTE
Update History & Physical    The patient's History and Physical of June 12, 2025 was reviewed with the patient and I examined the patient. There was no change. The surgical site was confirmed by the patient and me.     Plan: The risks, benefits, expected outcome, and alternative to the recommended procedure have been discussed with the patient. Patient understands and wants to proceed with the procedure.     Electronically signed by Gabby Schwartz MD on 7/7/2025 at 9:51 AM

## 2025-07-07 NOTE — BRIEF OP NOTE
Brief Postoperative Note      Patient: Jania Patel  YOB: 1985  MRN: [id number]    Date of Procedure: 7/7/2025    Pre-Op Diagnosis Codes:      * Abnormal uterine bleeding [N93.9]    Post-Op Diagnosis: Same and endometriosis, left ureter transection        Procedure(s):  ROBOTIC ASSISTED TOTAL LAPAROSCOPIC HYSTERECTOMY, BILATERAL SALPINGECTOMY, LEFT OOPHRECTOMY, ENDOMETRIOSIS EXCISION, CYSTOSCOPY    Surgeon(s):  Chris Cortez MD Rousseau, Michael Blaise, MD Dearworth, Hannah, MD    Assistant:  Surgical Assistant: Mj Sotelo Debra    Anesthesia: General    Estimated Blood Loss (mL): less than 50     Complications: Other: left ureter transection (see DR. Cortez note) Urology consulted     Specimens:   ID Type Source Tests Collected by Time Destination   A : Right Posterior Cul De Sac Endometriosis Tissue Tissue SURGICAL PATHOLOGY Gabby Schwartz MD 7/7/2025 1112    B :  Tissue Tissue SURGICAL PATHOLOGY Gabby Schwartz MD 7/7/2025 1139        Implants:  * No implants in log *      Drains:   Closed/Suction Drain Left LLQ Bulb (Active)       Urinary Catheter 07/07/25 2 Way (Active)       [REMOVED] Urinary Catheter 07/07/25 2 Way (Removed)       Findings:  Infection Present At Time Of Surgery (PATOS) (choose all levels that have infection present):  No infection present  Other Findings: 7cm uterus, posterior CDS endometriosis present, Left ovary adhered to pelvic side wall. Right lower posterior CDS endometriosis excised. Normal right ovary. Complete transection of the distal 4cm ureter. Normal bilateral fallopian tubes.     Electronically signed by Gabby Schwartz MD on 7/7/2025 at 2:10 PM

## 2025-07-07 NOTE — OR NURSING
Pt arrived for image guided nephrostomy Tube insertion left . Procedure explained including the risk and benefits of the procedure. All questions answered. Pt verbalizes understanding of the procedure and states no more questions. Consent confirmed. Vital signs stable. Labs, allergies, medications, and code status reviewed. No contraindications noted.     Vital Signs  Vitals:    07/07/25 0759   BP: 118/71   Pulse: 66   Resp: 17   Temp: 97.7 °F (36.5 °C)   SpO2: 100%    (vital signs in table format)    Pre Sudhir Score    Pt is intubated. Arrived to IR for emergent procedure on vent.   Allergies  Penicillins and Amoxicillin (allergies)    Labs  Lab Results   Component Value Date    INR 0.94 08/12/2021    PROTIME 10.6 08/12/2021     Lab Results   Component Value Date    CREATININE 0.5 (L) 07/07/2025    BUN 14 07/07/2025     07/07/2025    K 4.0 07/07/2025     07/07/2025    CO2 20 (L) 07/07/2025     Lab Results   Component Value Date    WBC 6.3 07/07/2025    HGB 13.9 07/07/2025    HCT 40.5 07/07/2025    MCV 95.7 07/07/2025     07/07/2025

## 2025-07-07 NOTE — PLAN OF CARE
Problem: Discharge Planning  Goal: Discharge to home or other facility with appropriate resources  Outcome: Progressing  Flowsheets (Taken 7/7/2025 1839)  Discharge to home or other facility with appropriate resources: Identify barriers to discharge with patient and caregiver     Problem: Pain  Goal: Verbalizes/displays adequate comfort level or baseline comfort level  Outcome: Progressing  Flowsheets (Taken 7/7/2025 1707)  Verbalizes/displays adequate comfort level or baseline comfort level: Assess pain using appropriate pain scale     Problem: Gastrointestinal - Adult  Goal: Minimal or absence of nausea and vomiting  Outcome: Progressing  Flowsheets (Taken 7/7/2025 1850)  Minimal or absence of nausea and vomiting: Provide nonpharmacologic comfort measures as appropriate     Problem: Genitourinary - Adult  Goal: Absence of urinary retention  Outcome: Progressing  Flowsheets (Taken 7/7/2025 1850)  Absence of urinary retention: Assess patient’s ability to void and empty bladder

## 2025-07-07 NOTE — PROGRESS NOTES
Notified [facility] on call messaging system to notify OB/GYN Angle Anthony of needing orders for pt. That has been admitted per Dr. Gabby Schwartz. Ice applied to abdomen.

## 2025-07-07 NOTE — CONSULTS
2025  Jania Patel    Reason for Consult:  Left ureteral injury  Requesting Physician:  Lana      History Obtained From:  electronic medical record, attending physician    HISTORY OF PRESENT ILLNESS:                The patient is a 40 y.o. female who presents with abnormal bleeding and pelvic pain.  She underwent a robotic hysterectomy by Dr Schwartz this am. The left ureter was caught up in significant adhesions and was injured during dissection of the left ovary.  I was called in by Dr. Schwartz to offer urologic assistance.    Past Medical History:        Diagnosis Date    Chronic back pain     GERD (gastroesophageal reflux disease)     Malignant melanoma 2005    melanoma, right upper arm, forehead, excised    Painful orthopaedic hardware 2024    Wears glasses      Past Surgical History:        Procedure Laterality Date    ANKLE SURGERY Right 2024    RIGHT MIDFOOT HARDWARE REMOVAL performed by Lalo Gupta MD at [facility] OR    BREAST BIOPSY      DILATION AND CURETTAGE OF UTERUS      16 years old. miscarried and had d&c    DILATION AND CURETTAGE OF UTERUS  2018    FOOT FRACTURE SURGERY Right 2020    OPEN REDUCTION INTERNAL FIXATION RIGHT LISFRANC FRACTURE DISLOCATION WITH MINI C-ARM AND BLOCK FOR PAIN CONTROL        [facility] performed by Lalo Gupta MD at [facility] OR    INDUCED       22years old had elective     KNEE SURGERY      KNEE SURGERY  2010    [place] TX MISSED  FIRST TRIMESTER SURGICAL N/A 2018    SUCTION DILATATION AND CURETTAGE performed by Troy Sol MD at [facility] OR     Current Medications:   Current Facility-Administered Medications: lactated ringers infusion, , IntraVENous, Continuous  sodium chloride flush 0.9 % injection 5-40 mL, 5-40 mL, IntraVENous, 2 times per day  sodium chloride flush 0.9 % injection 5-40 mL, 5-40 mL, IntraVENous, PRN  0.9 % sodium chloride

## 2025-07-07 NOTE — PROGRESS NOTES
4 Eyes Skin Assessment     NAME:  Jania Patel  YOB: 1985  MEDICAL RECORD NUMBER:  [id number]    The patient is being assessed for  Admission    I agree that at least one RN has performed a thorough Head to Toe Skin Assessment on the patient. ALL assessment sites listed below have been assessed.      Areas assessed by both nurses:    Head, Face, Ears, Shoulders, Back, Chest, Arms, Elbows, Hands, Sacrum. Buttock, Coccyx, Ischium, Legs. Feet and Heels, and Under Medical Devices     See Lap sites (4)  CATE drain in place  Nephrostomy tube to left side of back        Does the Patient have a Wound? No noted wound(s)       Krishna Prevention initiated by RN: No  Wound Care Orders initiated by RN: No    Pressure Injury (Stage 1,2,3,4, Unstageable, DTI, NWPT, and Complex wounds) if present, place Wound referral order by RN under : No    New Ostomies, if present place, Ostomy referral order under : No     Nurse 1 eSignature: Electronically signed by Padmini Reyes RN on 7/7/25 at 5:57 PM EDT    **SHARE this note so that the co-signing nurse can place an eSignature**    Nurse 2 eSignature: Electronically signed by Ana Cristina Baez RN on 7/7/25 at 6:57 PM EDT

## 2025-07-07 NOTE — PROGRESS NOTES
Patient admitted to room 222 from OR.  Patient oriented to room, call light, bed rails, phone, lights and bathroom.  Patient instructed about the schedule of the day including: vital sign frequency, lab draws, possible tests, frequency of MD and staff rounds, including RN/MD rounding together at bedside, daily weights, and I &O's.  Patient instructed about prescribed diet, how to order meals, and television.  Bed locked, in lowest position, side rails up 2/4, call light within reach.  Will continue to monitor.

## 2025-07-07 NOTE — PROGRESS NOTES
Pharmacy verified meds, obtained meds from [employer] and administered to pt. Per request for pain 10/10 in abdomen and back. Ice applied to abdomen, drinks provided to pt. Per request. Pt. NPO after MN for another procedure.

## 2025-07-07 NOTE — OP NOTE
Operative Note      Patient: Jania Patel  YOB: 1985  MRN: [id number]    Date of Procedure: 7/7/2025    Pre-Op Diagnosis Codes:      * Abnormal uterine bleeding [N93.9]    Post-Op Diagnosis: Same and endometriosis, left ureter transection        Procedure(s):  ROBOTIC ASSISTED TOTAL LAPAROSCOPIC HYSTERECTOMY, BILATERAL SALPINGECTOMY, LEFT OOPHRECTOMY, ENDOMETRIOSIS EXCISION, CYSTOSCOPY    Surgeon(s):  Chris Cortez MD Rousseau, Michael Blaise, MD Dearworth, Hannah, MD    Assistant:  Surgical Assistant: Mj Sotelo Debra    Anesthesia: General    Estimated Blood Loss (mL): less than 50   UO:     Complications: Other: left ureter transection (see DR. Cortez note) Urology consulted     Specimens:   ID Type Source Tests Collected by Time Destination   A : Right Posterior Cul De Sac Endometriosis Tissue Tissue SURGICAL PATHOLOGY Gabby Schwartz MD 7/7/2025 1112    B :  Tissue Tissue SURGICAL PATHOLOGY Gabby Schwartz MD 7/7/2025 1139        Implants:  * No implants in log *      Drains:   Closed/Suction Drain Left LLQ Bulb (Active)       Urinary Catheter 07/07/25 2 Way (Active)       [REMOVED] Urinary Catheter 07/07/25 2 Way (Removed)       Findings:  Infection Present At Time Of Surgery (PATOS) (choose all levels that have infection present):  No infection present  Other Findings: 7cm uterus, posterior CDS endometriosis present, Left ovary adhered to pelvic side wall. Right lower posterior CDS endometriosis excised. Normal right ovary. Complete transection of the distal 4cm ureter. Normal bilateral fallopian tubes.     Detailed Description of Procedure:     Detailed Description of Procedure:   Patient was taken to the OR where general anesthesia was administered. Patient was positioned in the dorsal lithotomy position with legs in hydraulic stirrups. She was prepped and draped in the normal sterile fashion. Time out was performed. I placed an indwelling

## 2025-07-08 ENCOUNTER — ANESTHESIA EVENT (OUTPATIENT)
Dept: OPERATING ROOM | Age: 40
DRG: 742 | End: 2025-07-08
Payer: COMMERCIAL

## 2025-07-08 ENCOUNTER — ANESTHESIA (OUTPATIENT)
Dept: OPERATING ROOM | Age: 40
DRG: 742 | End: 2025-07-08
Payer: COMMERCIAL

## 2025-07-08 PROBLEM — G89.18 POST-OP PAIN: Status: ACTIVE | Noted: 2025-07-08

## 2025-07-08 LAB
ALBUMIN SERPL-MCNC: 3.3 G/DL (ref 3.4–5)
ALBUMIN/GLOB SERPL: 1.7 {RATIO} (ref 1.1–2.2)
ALP SERPL-CCNC: 32 U/L (ref 40–129)
ALT SERPL-CCNC: 9 U/L (ref 10–40)
ANION GAP SERPL CALCULATED.3IONS-SCNC: 9 MMOL/L (ref 3–16)
AST SERPL-CCNC: 10 U/L (ref 15–37)
BILIRUB SERPL-MCNC: 1.5 MG/DL (ref 0–1)
BUN SERPL-MCNC: 12 MG/DL (ref 7–20)
CALCIUM SERPL-MCNC: 7.5 MG/DL (ref 8.3–10.6)
CHLORIDE SERPL-SCNC: 101 MMOL/L (ref 99–110)
CO2 SERPL-SCNC: 24 MMOL/L (ref 21–32)
CREAT SERPL-MCNC: 0.6 MG/DL (ref 0.6–1.1)
GFR SERPLBLD CREATININE-BSD FMLA CKD-EPI: >90 ML/MIN/{1.73_M2}
GLUCOSE SERPL-MCNC: 90 MG/DL (ref 70–99)
POTASSIUM SERPL-SCNC: 3.7 MMOL/L (ref 3.5–5.1)
PROT SERPL-MCNC: 5.3 G/DL (ref 6.4–8.2)
SODIUM SERPL-SCNC: 134 MMOL/L (ref 136–145)

## 2025-07-08 PROCEDURE — C2617 STENT, NON-COR, TEM W/O DEL: HCPCS | Performed by: UROLOGY

## 2025-07-08 PROCEDURE — 6360000002 HC RX W HCPCS: Performed by: STUDENT IN AN ORGANIZED HEALTH CARE EDUCATION/TRAINING PROGRAM

## 2025-07-08 PROCEDURE — 7100000001 HC PACU RECOVERY - ADDTL 15 MIN: Performed by: UROLOGY

## 2025-07-08 PROCEDURE — 3700000001 HC ADD 15 MINUTES (ANESTHESIA): Performed by: UROLOGY

## 2025-07-08 PROCEDURE — 6370000000 HC RX 637 (ALT 250 FOR IP): Performed by: UROLOGY

## 2025-07-08 PROCEDURE — 6360000002 HC RX W HCPCS: Performed by: UROLOGY

## 2025-07-08 PROCEDURE — 2500000003 HC RX 250 WO HCPCS: Performed by: STUDENT IN AN ORGANIZED HEALTH CARE EDUCATION/TRAINING PROGRAM

## 2025-07-08 PROCEDURE — 6370000000 HC RX 637 (ALT 250 FOR IP)

## 2025-07-08 PROCEDURE — 3600000009 HC SURGERY ROBOT BASE: Performed by: UROLOGY

## 2025-07-08 PROCEDURE — S2900 ROBOTIC SURGICAL SYSTEM: HCPCS | Performed by: UROLOGY

## 2025-07-08 PROCEDURE — 3600000019 HC SURGERY ROBOT ADDTL 15MIN: Performed by: UROLOGY

## 2025-07-08 PROCEDURE — 2709999900 HC NON-CHARGEABLE SUPPLY: Performed by: UROLOGY

## 2025-07-08 PROCEDURE — 2580000003 HC RX 258: Performed by: UROLOGY

## 2025-07-08 PROCEDURE — 36415 COLL VENOUS BLD VENIPUNCTURE: CPT

## 2025-07-08 PROCEDURE — 6370000000 HC RX 637 (ALT 250 FOR IP): Performed by: STUDENT IN AN ORGANIZED HEALTH CARE EDUCATION/TRAINING PROGRAM

## 2025-07-08 PROCEDURE — G0378 HOSPITAL OBSERVATION PER HR: HCPCS

## 2025-07-08 PROCEDURE — 2500000003 HC RX 250 WO HCPCS: Performed by: ANESTHESIOLOGY

## 2025-07-08 PROCEDURE — 3700000000 HC ANESTHESIA ATTENDED CARE: Performed by: UROLOGY

## 2025-07-08 PROCEDURE — 6360000002 HC RX W HCPCS: Performed by: ANESTHESIOLOGY

## 2025-07-08 PROCEDURE — 7100000000 HC PACU RECOVERY - FIRST 15 MIN: Performed by: UROLOGY

## 2025-07-08 PROCEDURE — 2500000003 HC RX 250 WO HCPCS: Performed by: NURSE ANESTHETIST, CERTIFIED REGISTERED

## 2025-07-08 PROCEDURE — 6360000002 HC RX W HCPCS: Performed by: NURSE ANESTHETIST, CERTIFIED REGISTERED

## 2025-07-08 PROCEDURE — 99233 SBSQ HOSP IP/OBS HIGH 50: CPT

## 2025-07-08 PROCEDURE — 80053 COMPREHEN METABOLIC PANEL: CPT

## 2025-07-08 DEVICE — URETERAL STENT
Type: IMPLANTABLE DEVICE | Site: URETER | Status: FUNCTIONAL
Brand: CONTOUR™

## 2025-07-08 RX ORDER — DIPHENHYDRAMINE HYDROCHLORIDE 50 MG/ML
12.5 INJECTION, SOLUTION INTRAMUSCULAR; INTRAVENOUS
Status: DISCONTINUED | OUTPATIENT
Start: 2025-07-08 | End: 2025-07-08 | Stop reason: HOSPADM

## 2025-07-08 RX ORDER — SODIUM CHLORIDE 9 MG/ML
INJECTION, SOLUTION INTRAVENOUS PRN
Status: DISCONTINUED | OUTPATIENT
Start: 2025-07-08 | End: 2025-07-08 | Stop reason: HOSPADM

## 2025-07-08 RX ORDER — LIDOCAINE HYDROCHLORIDE 20 MG/ML
INJECTION, SOLUTION INFILTRATION; PERINEURAL
Status: DISCONTINUED | OUTPATIENT
Start: 2025-07-08 | End: 2025-07-08 | Stop reason: SDUPTHER

## 2025-07-08 RX ORDER — OXYCODONE HYDROCHLORIDE 5 MG/1
5 TABLET ORAL PRN
Status: DISCONTINUED | OUTPATIENT
Start: 2025-07-08 | End: 2025-07-08 | Stop reason: HOSPADM

## 2025-07-08 RX ORDER — KETOROLAC TROMETHAMINE 30 MG/ML
15 INJECTION, SOLUTION INTRAMUSCULAR; INTRAVENOUS EVERY 6 HOURS PRN
Status: DISCONTINUED | OUTPATIENT
Start: 2025-07-08 | End: 2025-07-10 | Stop reason: HOSPADM

## 2025-07-08 RX ORDER — MIDAZOLAM HYDROCHLORIDE 1 MG/ML
INJECTION, SOLUTION INTRAMUSCULAR; INTRAVENOUS
Status: DISCONTINUED | OUTPATIENT
Start: 2025-07-08 | End: 2025-07-08 | Stop reason: SDUPTHER

## 2025-07-08 RX ORDER — ONDANSETRON 2 MG/ML
INJECTION INTRAMUSCULAR; INTRAVENOUS
Status: DISCONTINUED | OUTPATIENT
Start: 2025-07-08 | End: 2025-07-08 | Stop reason: SDUPTHER

## 2025-07-08 RX ORDER — SODIUM CHLORIDE 0.9 % (FLUSH) 0.9 %
5-40 SYRINGE (ML) INJECTION EVERY 12 HOURS SCHEDULED
Status: DISCONTINUED | OUTPATIENT
Start: 2025-07-08 | End: 2025-07-08 | Stop reason: HOSPADM

## 2025-07-08 RX ORDER — MEPERIDINE HYDROCHLORIDE 25 MG/ML
12.5 INJECTION INTRAMUSCULAR; INTRAVENOUS; SUBCUTANEOUS EVERY 5 MIN PRN
Status: DISCONTINUED | OUTPATIENT
Start: 2025-07-08 | End: 2025-07-08 | Stop reason: HOSPADM

## 2025-07-08 RX ORDER — SODIUM CHLORIDE 0.9 % (FLUSH) 0.9 %
5-40 SYRINGE (ML) INJECTION PRN
Status: DISCONTINUED | OUTPATIENT
Start: 2025-07-08 | End: 2025-07-08 | Stop reason: HOSPADM

## 2025-07-08 RX ORDER — OXYCODONE HYDROCHLORIDE 5 MG/1
10 TABLET ORAL PRN
Status: DISCONTINUED | OUTPATIENT
Start: 2025-07-08 | End: 2025-07-08 | Stop reason: HOSPADM

## 2025-07-08 RX ORDER — HYDROMORPHONE HYDROCHLORIDE 2 MG/ML
INJECTION, SOLUTION INTRAMUSCULAR; INTRAVENOUS; SUBCUTANEOUS
Status: DISCONTINUED | OUTPATIENT
Start: 2025-07-08 | End: 2025-07-08 | Stop reason: SDUPTHER

## 2025-07-08 RX ORDER — FENTANYL CITRATE 50 UG/ML
INJECTION, SOLUTION INTRAMUSCULAR; INTRAVENOUS
Status: DISCONTINUED | OUTPATIENT
Start: 2025-07-08 | End: 2025-07-08 | Stop reason: SDUPTHER

## 2025-07-08 RX ORDER — ROCURONIUM BROMIDE 10 MG/ML
INJECTION, SOLUTION INTRAVENOUS
Status: DISCONTINUED | OUTPATIENT
Start: 2025-07-08 | End: 2025-07-08 | Stop reason: SDUPTHER

## 2025-07-08 RX ORDER — PROPOFOL 10 MG/ML
INJECTION, EMULSION INTRAVENOUS
Status: DISCONTINUED | OUTPATIENT
Start: 2025-07-08 | End: 2025-07-08 | Stop reason: SDUPTHER

## 2025-07-08 RX ORDER — ACETAMINOPHEN 325 MG/1
650 TABLET ORAL EVERY 8 HOURS
Status: COMPLETED | OUTPATIENT
Start: 2025-07-08 | End: 2025-07-10

## 2025-07-08 RX ORDER — BUPIVACAINE HYDROCHLORIDE 5 MG/ML
INJECTION, SOLUTION PERINEURAL PRN
Status: DISCONTINUED | OUTPATIENT
Start: 2025-07-08 | End: 2025-07-08 | Stop reason: ALTCHOICE

## 2025-07-08 RX ORDER — MORPHINE SULFATE 2 MG/ML
2 INJECTION, SOLUTION INTRAMUSCULAR; INTRAVENOUS EVERY 4 HOURS PRN
Refills: 0 | Status: DISCONTINUED | OUTPATIENT
Start: 2025-07-08 | End: 2025-07-08

## 2025-07-08 RX ORDER — ONDANSETRON 2 MG/ML
4 INJECTION INTRAMUSCULAR; INTRAVENOUS
Status: DISCONTINUED | OUTPATIENT
Start: 2025-07-08 | End: 2025-07-08 | Stop reason: HOSPADM

## 2025-07-08 RX ORDER — HYDROCHLOROTHIAZIDE 25 MG/1
12.5 TABLET ORAL DAILY
Status: DISCONTINUED | OUTPATIENT
Start: 2025-07-08 | End: 2025-07-10 | Stop reason: HOSPADM

## 2025-07-08 RX ORDER — SENNOSIDES 8.6 MG/1
1 TABLET ORAL NIGHTLY
Status: DISCONTINUED | OUTPATIENT
Start: 2025-07-08 | End: 2025-07-10 | Stop reason: HOSPADM

## 2025-07-08 RX ORDER — BUSPIRONE HYDROCHLORIDE 10 MG/1
10 TABLET ORAL 3 TIMES DAILY
Status: DISCONTINUED | OUTPATIENT
Start: 2025-07-08 | End: 2025-07-10 | Stop reason: HOSPADM

## 2025-07-08 RX ORDER — LABETALOL HYDROCHLORIDE 5 MG/ML
5 INJECTION, SOLUTION INTRAVENOUS EVERY 10 MIN PRN
Status: DISCONTINUED | OUTPATIENT
Start: 2025-07-08 | End: 2025-07-08 | Stop reason: HOSPADM

## 2025-07-08 RX ORDER — DEXAMETHASONE SODIUM PHOSPHATE 4 MG/ML
INJECTION, SOLUTION INTRA-ARTICULAR; INTRALESIONAL; INTRAMUSCULAR; INTRAVENOUS; SOFT TISSUE
Status: DISCONTINUED | OUTPATIENT
Start: 2025-07-08 | End: 2025-07-08 | Stop reason: SDUPTHER

## 2025-07-08 RX ADMIN — ACETAMINOPHEN 650 MG: 325 TABLET ORAL at 23:10

## 2025-07-08 RX ADMIN — DOCUSATE SODIUM 100 MG: 100 CAPSULE, LIQUID FILLED ORAL at 23:10

## 2025-07-08 RX ADMIN — ROCURONIUM BROMIDE 50 MG: 10 INJECTION, SOLUTION INTRAVENOUS at 16:14

## 2025-07-08 RX ADMIN — BUSPIRONE HYDROCHLORIDE 10 MG: 10 TABLET ORAL at 23:10

## 2025-07-08 RX ADMIN — FAMOTIDINE 20 MG: 20 TABLET, FILM COATED ORAL at 23:10

## 2025-07-08 RX ADMIN — ROCURONIUM BROMIDE 20 MG: 10 INJECTION, SOLUTION INTRAVENOUS at 18:30

## 2025-07-08 RX ADMIN — ROCURONIUM BROMIDE 15 MG: 10 INJECTION, SOLUTION INTRAVENOUS at 19:08

## 2025-07-08 RX ADMIN — HYDROCHLOROTHIAZIDE 12.5 MG: 25 TABLET ORAL at 12:28

## 2025-07-08 RX ADMIN — OXYCODONE 10 MG: 5 TABLET ORAL at 04:55

## 2025-07-08 RX ADMIN — DOCUSATE SODIUM 100 MG: 100 CAPSULE, LIQUID FILLED ORAL at 08:17

## 2025-07-08 RX ADMIN — OXYBUTYNIN CHLORIDE 5 MG: 5 TABLET ORAL at 23:10

## 2025-07-08 RX ADMIN — KETOROLAC TROMETHAMINE 30 MG: 30 INJECTION, SOLUTION INTRAMUSCULAR at 01:43

## 2025-07-08 RX ADMIN — LIDOCAINE HYDROCHLORIDE 50 MG: 20 INJECTION, SOLUTION INFILTRATION; PERINEURAL at 16:52

## 2025-07-08 RX ADMIN — FENTANYL CITRATE 50 MCG: 50 INJECTION INTRAMUSCULAR; INTRAVENOUS at 19:56

## 2025-07-08 RX ADMIN — Medication 10 MG: at 17:11

## 2025-07-08 RX ADMIN — FENTANYL CITRATE 25 MCG: 50 INJECTION INTRAMUSCULAR; INTRAVENOUS at 20:09

## 2025-07-08 RX ADMIN — OXYBUTYNIN CHLORIDE 5 MG: 5 TABLET ORAL at 08:17

## 2025-07-08 RX ADMIN — MIDAZOLAM 2 MG: 1 INJECTION INTRAMUSCULAR; INTRAVENOUS at 16:01

## 2025-07-08 RX ADMIN — ONDANSETRON 4 MG: 2 INJECTION, SOLUTION INTRAMUSCULAR; INTRAVENOUS at 16:09

## 2025-07-08 RX ADMIN — Medication 10 ML: at 23:16

## 2025-07-08 RX ADMIN — HYDROMORPHONE HYDROCHLORIDE 1 MG: 2 INJECTION, SOLUTION INTRAMUSCULAR; INTRAVENOUS; SUBCUTANEOUS at 17:25

## 2025-07-08 RX ADMIN — SENNOSIDES 8.6 MG: 8.6 TABLET, FILM COATED ORAL at 23:10

## 2025-07-08 RX ADMIN — ROCURONIUM BROMIDE 20 MG: 10 INJECTION, SOLUTION INTRAVENOUS at 16:43

## 2025-07-08 RX ADMIN — KETOROLAC TROMETHAMINE 30 MG: 30 INJECTION, SOLUTION INTRAMUSCULAR at 12:27

## 2025-07-08 RX ADMIN — FENTANYL CITRATE 50 MCG: 50 INJECTION INTRAMUSCULAR; INTRAVENOUS at 16:14

## 2025-07-08 RX ADMIN — Medication 10 MG: at 16:50

## 2025-07-08 RX ADMIN — CEFAZOLIN 2000 MG: 2 INJECTION, POWDER, FOR SOLUTION INTRAVENOUS at 16:33

## 2025-07-08 RX ADMIN — PROPOFOL 25 MG: 10 INJECTION, EMULSION INTRAVENOUS at 19:56

## 2025-07-08 RX ADMIN — OXYBUTYNIN CHLORIDE 5 MG: 5 TABLET ORAL at 12:28

## 2025-07-08 RX ADMIN — FENTANYL CITRATE 25 MCG: 50 INJECTION INTRAMUSCULAR; INTRAVENOUS at 20:07

## 2025-07-08 RX ADMIN — LIDOCAINE HYDROCHLORIDE 100 MG: 20 INJECTION, SOLUTION INFILTRATION; PERINEURAL at 16:14

## 2025-07-08 RX ADMIN — OXYCODONE HYDROCHLORIDE AND ACETAMINOPHEN 1 TABLET: 10; 325 TABLET ORAL at 01:42

## 2025-07-08 RX ADMIN — ROCURONIUM BROMIDE 25 MG: 10 INJECTION, SOLUTION INTRAVENOUS at 17:21

## 2025-07-08 RX ADMIN — BUSPIRONE HYDROCHLORIDE 10 MG: 10 TABLET ORAL at 12:29

## 2025-07-08 RX ADMIN — OXYCODONE HYDROCHLORIDE AND ACETAMINOPHEN 1 TABLET: 10; 325 TABLET ORAL at 08:16

## 2025-07-08 RX ADMIN — Medication 30 MG: at 16:14

## 2025-07-08 RX ADMIN — FAMOTIDINE 20 MG: 20 TABLET, FILM COATED ORAL at 08:17

## 2025-07-08 RX ADMIN — CEFAZOLIN 2000 MG: 2 INJECTION, POWDER, FOR SOLUTION INTRAVENOUS at 23:17

## 2025-07-08 RX ADMIN — PROPOFOL 25 MG: 10 INJECTION, EMULSION INTRAVENOUS at 19:52

## 2025-07-08 RX ADMIN — KETOROLAC TROMETHAMINE 15 MG: 30 INJECTION, SOLUTION INTRAMUSCULAR at 23:12

## 2025-07-08 RX ADMIN — PROPOFOL 25 MG: 10 INJECTION, EMULSION INTRAVENOUS at 20:03

## 2025-07-08 RX ADMIN — HYDROMORPHONE HYDROCHLORIDE 0.5 MG: 1 INJECTION, SOLUTION INTRAMUSCULAR; INTRAVENOUS; SUBCUTANEOUS at 21:23

## 2025-07-08 RX ADMIN — PROPOFOL 150 MG: 10 INJECTION, EMULSION INTRAVENOUS at 16:14

## 2025-07-08 RX ADMIN — HYDROMORPHONE HYDROCHLORIDE 0.5 MG: 1 INJECTION, SOLUTION INTRAMUSCULAR; INTRAVENOUS; SUBCUTANEOUS at 20:57

## 2025-07-08 RX ADMIN — SUGAMMADEX 200 MG: 100 INJECTION, SOLUTION INTRAVENOUS at 20:11

## 2025-07-08 RX ADMIN — Medication 10 ML: at 08:18

## 2025-07-08 RX ADMIN — FENTANYL CITRATE 50 MCG: 50 INJECTION INTRAMUSCULAR; INTRAVENOUS at 16:50

## 2025-07-08 RX ADMIN — ROCURONIUM BROMIDE 20 MG: 10 INJECTION, SOLUTION INTRAVENOUS at 17:57

## 2025-07-08 RX ADMIN — OXYCODONE 10 MG: 5 TABLET ORAL at 10:47

## 2025-07-08 RX ADMIN — HYDROMORPHONE HYDROCHLORIDE 1 MG: 2 INJECTION, SOLUTION INTRAMUSCULAR; INTRAVENOUS; SUBCUTANEOUS at 17:58

## 2025-07-08 RX ADMIN — DEXAMETHASONE SODIUM PHOSPHATE 4 MG: 4 INJECTION, SOLUTION INTRAMUSCULAR; INTRAVENOUS at 16:09

## 2025-07-08 RX ADMIN — KETOROLAC TROMETHAMINE 30 MG: 30 INJECTION, SOLUTION INTRAMUSCULAR at 05:58

## 2025-07-08 ASSESSMENT — PAIN SCALES - GENERAL
PAINLEVEL_OUTOF10: 2
PAINLEVEL_OUTOF10: 7
PAINLEVEL_OUTOF10: 0
PAINLEVEL_OUTOF10: 7
PAINLEVEL_OUTOF10: 5
PAINLEVEL_OUTOF10: 0
PAINLEVEL_OUTOF10: 7
PAINLEVEL_OUTOF10: 7
PAINLEVEL_OUTOF10: 3
PAINLEVEL_OUTOF10: 4
PAINLEVEL_OUTOF10: 2
PAINLEVEL_OUTOF10: 6
PAINLEVEL_OUTOF10: 7
PAINLEVEL_OUTOF10: 2
PAINLEVEL_OUTOF10: 5

## 2025-07-08 ASSESSMENT — PAIN DESCRIPTION - DESCRIPTORS
DESCRIPTORS: ACHING;SORE
DESCRIPTORS: ACHING;SORE
DESCRIPTORS: BURNING;STABBING
DESCRIPTORS: DISCOMFORT
DESCRIPTORS: ACHING;DISCOMFORT
DESCRIPTORS: DISCOMFORT
DESCRIPTORS: DISCOMFORT

## 2025-07-08 ASSESSMENT — PAIN DESCRIPTION - ORIENTATION
ORIENTATION: MID;LEFT
ORIENTATION: LEFT
ORIENTATION: MID;LEFT

## 2025-07-08 ASSESSMENT — PAIN - FUNCTIONAL ASSESSMENT
PAIN_FUNCTIONAL_ASSESSMENT: ACTIVITIES ARE NOT PREVENTED

## 2025-07-08 ASSESSMENT — PAIN DESCRIPTION - LOCATION
LOCATION: ABDOMEN;BACK
LOCATION: ABDOMEN
LOCATION: ABDOMEN;BACK
LOCATION: ABDOMEN
LOCATION: ABDOMEN;BACK
LOCATION: ABDOMEN
LOCATION: ABDOMEN

## 2025-07-08 ASSESSMENT — PAIN DESCRIPTION - PAIN TYPE: TYPE: ACUTE PAIN

## 2025-07-08 ASSESSMENT — PAIN DESCRIPTION - ONSET: ONSET: ON-GOING

## 2025-07-08 NOTE — PROGRESS NOTES
Handoff report and transfer of care given at bedside to Jody RN.  Patient in stable condition, denies needs/concerns at this time.  Call light within reach.

## 2025-07-08 NOTE — PROGRESS NOTES
Progress Note    Admit Date:  7/7/2025    40 y.o. female who presented to [facility] with scheduled outpatient laparoscopic hysterectomy with bilateral salpingectomy as well as cystoscopy.  Patient had presented due to abnormal uterine bleeding of note has under lying endometriosis and uterine she was also involved for left ureteral injury.     Hospitalist consult for post op pain      Plan for left ureteral reimplantation today     Subjective:  Ms. Patel today seen resting in bed. Is having some post op pain but it has improved   She is nervous for procedure.   No vomiting or diarrhea     Objective:   Patient Vitals for the past 4 hrs:   BP Temp Temp src Pulse Resp SpO2   07/08/25 0846 -- -- -- -- 18 --   07/08/25 0800 (!) 116/58 98.4 °F (36.9 °C) Oral 61 18 100 %          Intake/Output Summary (Last 24 hours) at 7/8/2025 1145  Last data filed at 7/8/2025 1053  Gross per 24 hour   Intake 0 ml   Output 1275 ml   Net -1275 ml       Physical Exam:    Gen: No distress. Alert. +Pleasant female   Eyes: PERRL. No sclera icterus. No conjunctival injection.   Neck: No JVD.  Trachea midline.  Resp: No accessory muscle use. No crackles. No wheezes. No rhonchi.   CV: Regular rate. Regular rhythm. No murmur.  No rub. No edema.   Peripheral Pulses: +2 palpable, equal bilaterally   GI:  Non-distended.  Normal bowel sounds.+Diffuse tenderness to abdomen, multiple incision sites,CATE drain    Skin: Warm and dry. No nodule on exposed extremities. No rash on exposed extremities.   M/S: No cyanosis. No joint deformity. No clubbing.   Neuro: Awake. Grossly nonfocal    Psych: Oriented x 3. No anxiety or agitation.         Medications:  sodium chloride flush, 5-40 mL, 2 times per day  ketorolac, 30 mg, Q6H   Followed by  ibuprofen, 800 mg, Q8H  docusate sodium, 100 mg, BID  famotidine, 20 mg, BID   Or  famotidine (PEPCID) injection, 20 mg, BID  oxyBUTYnin, 5 mg, TID      PRN Medications:  sodium chloride flush, 5-40 mL, PRN  sodium

## 2025-07-08 NOTE — CARE COORDINATION
Case Management Assessment  Initial Evaluation    Date/Time of Evaluation: 7/8/2025 10:17 AM  Assessment Completed by: Valencia Cooper RN    If patient is discharged prior to next notation, then this note serves as note for discharge by case management.    Patient Name: Jania Patel                   YOB: 1985  Diagnosis: Left ureteral injury [S37.10XA]  Left ureteral injury, initial encounter [S37.10XA]                   Date / Time: 7/7/2025  5:14 PM    Patient Admission Status: Observation   Readmission Risk (Low < 19, Mod (19-27), High > 27): Readmission Risk Score: 3.1    Current PCP: Kyleigh Hedrick APRN - CNP  PCP verified by CM? Yes    Chart Reviewed: Yes      History Provided by: Patient  Patient Orientation: Alert and Oriented    Patient Cognition: Alert    Hospitalization in the last 30 days (Readmission):  No    If yes, Readmission Assessment in CM Navigator will be completed.    Advance Directives:      Code Status: Full Code   Patient's Primary Decision Maker is: Legal Next of Kin    Primary Decision Maker: Adrianne Gordon [facility] - [phone]    Discharge Planning:    Patient lives with: (P) Spouse/Significant Other Type of Home: (P) House  Primary Care Giver: Self  Patient Support Systems include: Spouse/Significant Other   Current Financial resources: (P) Other (Comment) (commercial)  Current community resources: (P) None  Current services prior to admission: (P) None            Current DME:              Type of Home Care services:  (P) None    ADLS  Prior functional level: (P) Independent in ADLs/IADLs  Current functional level: (P) Independent in ADLs/IADLs    PT AM-PAC:   /24  OT AM-PAC:   /24    Family can provide assistance at DC: (P) Yes  Would you like Case Management to discuss the discharge plan with any other family members/significant others, and if so, who? (P) No  Plans to Return to Present Housing: (P) Yes  Other Identified Issues/Barriers to RETURNING to

## 2025-07-08 NOTE — FLOWSHEET NOTE
07/08/25 0800   Vital Signs   Temp 98.4 °F (36.9 °C)   Temp Source Oral   Pulse 61   Heart Rate Source Monitor   Respirations 18   BP (!) 116/58   MAP (Calculated) 77   Pain Assessment   Pain Assessment 0-10   Pain Level 5   Oxygen Therapy   SpO2 100 %   O2 Device None (Room air)   Rhythm Interpretation   Cardiac Rhythm   (no tele)       Lab Results   Component Value Date/Time    WBC 6.3 07/07/2025 08:30 AM    HGB 13.9 07/07/2025 08:30 AM    HCT 40.5 07/07/2025 08:30 AM     07/07/2025 08:30 AM     07/07/2025 08:30 AM    K 4.0 07/07/2025 08:30 AM    BUN 14 07/07/2025 08:30 AM    CREATININE 0.5 (L) 07/07/2025 08:30 AM    MG 1.70 (L) 09/19/2022 06:51 PM        AM Assessment completed.  Patient in bed.  Awake, Alert and oriented. Respirations easy unlabored.    Pain/Discomfort is being managed with PRN analgesics per MD orders (See MAR). Patient is able to express and rate pain using numerical scale. Nephrostomy tube in place draining kenneth urine, page in place with clear yellow urine and CATE drain in place with serosanguinous output.   Plan of care, education and safety measures reviewed and mutually agreed upon with the patient.   Calls appropriately. Needed items including call light in reach and exit alarm in place.  Edwin at bedside.

## 2025-07-08 NOTE — BRIEF OP NOTE
Drainage tube clipped to bed;Catheter secured to thigh;Tamper seal intact;Bag below bladder;Bag not on floor;Lack of dependent loop in tubing;Drainage bag less than half full 07/08/25 1438   Status Draining 07/08/25 1438   Output (mL) 150 mL 07/08/25 1053       Findings:  Infection Present At Time Of Surgery (PATOS) (choose all levels that have infection present):  No infection present  Other Findings:     Left ureteral reimplantation  Keep page 10 days and get cystogram outpt prior to page removal  JJ stent for 5 weeks    Send CATE fluid for creatinine in 1-2 days prior and try to remove to discharge.    Dict#:   [id number]    Electronically signed by Juan Alberto Gil MD on 7/8/2025 at 7:54 PM

## 2025-07-08 NOTE — ANESTHESIA PRE PROCEDURE
Department of Anesthesiology  Preprocedure Note       Name:  Jania Patel   Age:  40 y.o.  :  1985                                          MRN:  [id number]         Date:  2025      Surgeon: Surgeon(s):  Juan Alberto Gil MD    Procedure: Procedure(s):  LEFT URETERAL REIMPLANTATION LAPAROSCOPIC ROBOTIC    Medications prior to admission:   Prior to Admission medications    Medication Sig Start Date End Date Taking? Authorizing Provider   busPIRone (BUSPAR) 10 MG tablet Take 1 tablet by mouth 3 times daily   Yes ProviderJean MD   hydroCHLOROthiazide 12.5 MG tablet Take 1 tablet by mouth daily Indications: swelling   Yes ProviderJean MD   Nutritional Supplements (VITAMIN D BOOSTER PO) Take 1 tablet by mouth daily   Yes Jean Kenney MD   omeprazole (PRILOSEC) 20 MG delayed release capsule Take 2 capsules by mouth daily   Yes Jean Kenney MD   HYDROcodone-acetaminophen (NORCO) 5-325 MG per tablet Take 1 tablet by mouth every 6 hours as needed for Pain.    ProviderJean MD   ibuprofen (ADVIL;MOTRIN) 600 MG tablet Take 1 tablet by mouth every 8 hours as needed for Pain 20  Chris Cole MD       Current medications:    Current Facility-Administered Medications   Medication Dose Route Frequency Provider Last Rate Last Admin   • busPIRone (BUSPAR) tablet 10 mg  10 mg Oral TID Padmiin Stratton PA   10 mg at 25 1229   • hydroCHLOROthiazide (HYDRODIURIL) tablet 12.5 mg  12.5 mg Oral Daily Padmini Stratton PA   12.5 mg at 25 1228   • HYDROmorphone (DILAUDID) injection 0.25 mg  0.25 mg IntraVENous Q4H PRN Padmini Stratton PA       • lactated ringers infusion   IntraVENous Continuous Gabby Schwartz  mL/hr at 25 1726 New Bag at 25 1726   • sodium chloride flush 0.9 % injection 5-40 mL  5-40 mL IntraVENous 2 times per day Gabby Schwartz MD   10 mL at 25 0818   • sodium chloride flush 0.9 % injection 5-40 mL

## 2025-07-08 NOTE — CONSULTS
Hospital Medicine Consult History & Physical    Date of Service: Pt seen/examined in consultation on pain control at the request of Gabby Schwartz MD for medical management of pain    Chief Complaint: Pain    Presenting Admission History:   40 y.o. female who presented to [facility] with scheduled outpatient laparoscopic hysterectomy with bilateral salpingectomy as well as cystoscopy.  Patient had presented due to abnormal uterine bleeding of note has under lying endometriosis and uterine she was also involved for left ureteral injury.  Patient had surgical intervention done at 1030 this morning hospitalist team consulted for pain control.  At this time acetaminophen as scheduled as well as Toradol scheduled by primary team.     PMHx significant for GERD: Endometriosis: Anxiety.      Assessment/Plan:    Current Principal Problem:  Left ureteral injury, initial encounter    Postop pain:   -One-time dose of morphine ordered  -As needed Percocet every 5 hours for moderate pain  -As needed Percocet 10 mg for severe pain  -Further recommendations per primary team if pain continues consider CT imaging per primary team    Anxiety: Patient is supposed to be on buspirone at home 10 mg 3 times a day at this time not receiving.  Buspirone restarted    GERD: Supposed be on omeprazole which is restarted      Physical Exam Performed:  /80   Pulse 56   Temp 98 °F (36.7 °C) (Oral)   Resp 20   Ht 1.753 m (5' 9\")   Wt 73 kg (161 lb) Comment: unable to weigh d/t pt. in extreme pain  SpO2 97%   BMI 23.78 kg/m²   General appearance:  No apparent distress, appears stated age and cooperative.  HEENT:  Pupils equal, round, and reactive to light. Conjunctivae/corneas clear.  Respiratory:  Normal respiratory effort. Clear to auscultation, bilaterally without Rales/Wheezes/Rhonchi.  Cardiovascular:  Regular rate and rhythm with normal S1/S2 without murmurs, rubs or gallops.  Abdomen:  Soft, diffuse abdominal tenderness

## 2025-07-08 NOTE — PROGRESS NOTES
Shift assessment complete; see flow sheet.  Scheduled medications administered; See MAR.  IV infusing without difficulty. Pt c/o surgical pain 10/10 notified MD at this time. Pt denies any needs at this time. Pt educated on use of call light and to call out with needs, verbalized understanding, bed in low locked position for pt safety

## 2025-07-08 NOTE — PROGRESS NOTES
Urology Progress Note      Subjective: Jania Patel c/o pain left flank, abd     Vitals:  BP (!) 116/58   Pulse 61   Temp 98.4 °F (36.9 °C) (Oral)   Resp 18   Ht 1.753 m (5' 9\")   Wt 73 kg (161 lb) Comment: unable to weigh d/t pt. in extreme pain  SpO2 100%   BMI 23.78 kg/m²   Temp  Av.1 °F (36.7 °C)  Min: 97.7 °F (36.5 °C)  Max: 98.5 °F (36.9 °C)    Intake/Output Summary (Last 24 hours) at 2025 0939  Last data filed at 2025 0910  Gross per 24 hour   Intake 0 ml   Output 1050 ml   Net -1050 ml       Exam:   Physical:  Well developed, well nourished in no acute distress  Mood indicates no abnormalities. Pt doesn’t appear depressed  Orientated to time and place  Abd soft, surgical incisions are CDI.  Mckenna with green tint urine, LEFT nephrostomy with pink urine, left abd drain serosanguinous output     Labs:  WBC:    Lab Results   Component Value Date/Time    WBC 6.3 2025 08:30 AM     Hemoglobin/Hematocrit:    Lab Results   Component Value Date/Time    HGB 13.9 2025 08:30 AM    HCT 40.5 2025 08:30 AM     BMP:    Lab Results   Component Value Date/Time     2025 08:14 AM    K 3.7 2025 08:14 AM    K 4.0 2025 08:30 AM     2025 08:14 AM    CO2 24 2025 08:14 AM    BUN 12 2025 08:14 AM    CREATININE 0.6 2025 08:14 AM    CALCIUM 7.5 2025 08:14 AM    GFRAA >60 2022 04:32 AM    LABGLOM >90 2025 08:14 AM     PT/INR:    Lab Results   Component Value Date/Time    PROTIME 10.6 2021 05:06 PM    INR 0.94 2021 05:06 PM           Impression/Plan:     #Left ureteral injury   - POD1 robotic assisted total laparoscopic hysterectomy with bilateral salpingectomy and left oophorectomy with OBGYN complicated by left ureteral injury   - left nephrostomy tube was placed by IR on 25   - to OR today with Dr Gil for robotic left ureteral reimplantation   - Discussed urologic surgery plans with patient and her

## 2025-07-08 NOTE — PROGRESS NOTES
4 Eyes Skin Assessment     NAME:  aJnia Patel  YOB: 1985  MEDICAL RECORD NUMBER:  [id number]    The patient is being assessed for  Admission    I agree that at least one RN has performed a thorough Head to Toe Skin Assessment on the patient. ALL assessment sites listed below have been assessed.      Areas assessed by both nurses:    Head, Face, Ears, Shoulders, Back, Chest, Arms, Elbows, Hands, Sacrum. Buttock, Coccyx, Ischium, and Legs. Feet and Heels        Does the Patient have a Wound? Yes wound(s) were present on assessment. LDA wound assessment was Initiated and completed by RN       5x incisions to abd following surgery, CATE drain, Mckenna, and Left side Nephro tube.    Krishna Prevention initiated by RN: No  Wound Care Orders initiated by RN: No    Pressure Injury (Stage 1,2,3,4, Unstageable, DTI, NWPT, and Complex wounds) if present, place Wound referral order by RN under : No    New Ostomies, if present place, Ostomy referral order under : No     Nurse 1 eSignature: Electronically signed by Loyda Conway RN on 7/8/25 at 3:32 AM EDT    **SHARE this note so that the co-signing nurse can place an eSignature**    Nurse 2 eSignature: Electronically signed by Latricia Bender RN on 7/9/25 at 6:19 AM EDT

## 2025-07-08 NOTE — PROGRESS NOTES
Called MD Schwartz about pt pain level at this time. MD states to reach out to urology and see what the recommendation is for bladder spasms.     2102-- Reached out to Urology at this time. Urology suggests  bladder spasms. And recommends pt to use oxybutynin at this time.     2104 -- Reached back out to MD who ordered Urology consult, Oxybutynin ordered, and wants a hospitalist consult    2107 -- Hospitalist consulted for pain control for the patient.    2110 -- Pain medication changed at this time. And One time order Morphine 2 mg added.

## 2025-07-08 NOTE — PROGRESS NOTES
OBGYN Attending Progress Note  POD#1 s/p ROBOTIC ASSISTED TOTAL LAPAROSCOPIC HYSTERECTOMY, BILATERAL SALPINGECTOMY, LEFT OOPHRECTOMY, ENDOMETRIOSIS EXCISION, CYSTOSCOPY, left nephrectomy tube placement by IR  EBL 50cc    S: Reports pain last night mostly on her left flank radiating into her back, NPO after midnight for left ureter repair today, pain controlled by meds overnight, not yet ambulating, Catheter in place, CATE drain with serosanginous fluid. Pt receiving tylenol, ibuprofen, toradol, oxycodone and oxybutynin overnight for pain control. Overnight 4pm to 5am: UO 250cc bright green from ICG injection, left drain 60ml bloody serosang, kenneth fluid in nephrectomy tube 400cc    O: /68   Pulse 57   Temp 98.1 °F (36.7 °C) (Oral)   Resp 16   Ht 1.753 m (5' 9\")   Wt 73 kg (161 lb) Comment: unable to weigh d/t pt. in extreme pain  SpO2 99%   BMI 23.78 kg/m²   General: AOx3  Cardiac: RRR  Pulm: CTAB  Abd: soft, non distended, moderate pain mostly in left side, BS+, incision port sites x4 with steri strips and Tegaderm in place. Left lateral port slightly saturated.   Ext: nontender, mild edema, SCDs in place and working     WBC/Hgb/Hct/Plts:  6.3/13.9/40.5/201 (07/07 0830)    A/P: POD#1 s/p ROBOTIC ASSISTED TOTAL LAPAROSCOPIC HYSTERECTOMY, BILATERAL SALPINGECTOMY, LEFT OOPHRECTOMY, ENDOMETRIOSIS EXCISION, CYSTOSCOPY, left nephrectomy tube placement by IR  1. Recovering well  2. NPO for left ureter repair today  3. Cont routine pp care  4. Continue strict I/O  5. Continue current pain medication regimen  6. Rest of care per urology, from GYN standpoint recovering well, discharge will be dependant on Urology recommendation.   7. CMP/CBC this morning is pending   Gabby Schwartz MD

## 2025-07-08 NOTE — PROGRESS NOTES
Report given to OR. All questions answered.  At the time of transfer, patient is awake, alert and in stable condition. Belongings remain in room 222. Family stepped out for a movement and will report to the OR upon return.

## 2025-07-09 PROBLEM — S37.10XA: Status: ACTIVE | Noted: 2025-07-09

## 2025-07-09 PROBLEM — E80.6 HYPERBILIRUBINEMIA: Status: ACTIVE | Noted: 2025-07-09

## 2025-07-09 PROBLEM — S37.10XA: Status: RESOLVED | Noted: 2025-07-09 | Resolved: 2025-07-09

## 2025-07-09 PROBLEM — R60.0 LOWER LEG EDEMA: Status: ACTIVE | Noted: 2025-07-09

## 2025-07-09 LAB
ALBUMIN SERPL-MCNC: 2.8 G/DL (ref 3.4–5)
ALP SERPL-CCNC: 30 U/L (ref 40–129)
ALT SERPL-CCNC: 7 U/L (ref 10–40)
ANION GAP SERPL CALCULATED.3IONS-SCNC: 11 MMOL/L (ref 3–16)
AST SERPL-CCNC: 12 U/L (ref 15–37)
BASOPHILS # BLD: 0 K/UL (ref 0–0.2)
BASOPHILS NFR BLD: 0.3 %
BILIRUB DIRECT SERPL-MCNC: 0.4 MG/DL (ref 0–0.3)
BILIRUB INDIRECT SERPL-MCNC: 0.6 MG/DL (ref 0–1)
BILIRUB SERPL-MCNC: 1 MG/DL (ref 0–1)
BUN SERPL-MCNC: 9 MG/DL (ref 7–20)
CALCIUM SERPL-MCNC: 7.8 MG/DL (ref 8.3–10.6)
CHLORIDE SERPL-SCNC: 108 MMOL/L (ref 99–110)
CO2 SERPL-SCNC: 20 MMOL/L (ref 21–32)
CREAT FLD-MCNC: 0.7 MG/DL
CREAT SERPL-MCNC: 0.7 MG/DL (ref 0.6–1.1)
DEPRECATED RDW RBC AUTO: 13.3 % (ref 12.4–15.4)
EOSINOPHIL # BLD: 0.1 K/UL (ref 0–0.6)
EOSINOPHIL NFR BLD: 0.9 %
GFR SERPLBLD CREATININE-BSD FMLA CKD-EPI: >90 ML/MIN/{1.73_M2}
GLUCOSE SERPL-MCNC: 95 MG/DL (ref 70–99)
HCT VFR BLD AUTO: 32.4 % (ref 36–48)
HGB BLD-MCNC: 10.9 G/DL (ref 12–16)
LYMPHOCYTES # BLD: 1.9 K/UL (ref 1–5.1)
LYMPHOCYTES NFR BLD: 25.3 %
MCH RBC QN AUTO: 32.7 PG (ref 26–34)
MCHC RBC AUTO-ENTMCNC: 33.6 G/DL (ref 31–36)
MCV RBC AUTO: 97.3 FL (ref 80–100)
MONOCYTES # BLD: 0.7 K/UL (ref 0–1.3)
MONOCYTES NFR BLD: 9.3 %
NEUTROPHILS # BLD: 4.7 K/UL (ref 1.7–7.7)
NEUTROPHILS NFR BLD: 64.2 %
PLATELET # BLD AUTO: 152 K/UL (ref 135–450)
PMV BLD AUTO: 8.5 FL (ref 5–10.5)
POTASSIUM SERPL-SCNC: 3.9 MMOL/L (ref 3.5–5.1)
PROT SERPL-MCNC: 4.5 G/DL (ref 6.4–8.2)
RBC # BLD AUTO: 3.34 M/UL (ref 4–5.2)
SODIUM SERPL-SCNC: 139 MMOL/L (ref 136–145)
SPECIMEN SOURCE FLD: NORMAL
WBC # BLD AUTO: 7.3 K/UL (ref 4–11)

## 2025-07-09 PROCEDURE — 6370000000 HC RX 637 (ALT 250 FOR IP): Performed by: INTERNAL MEDICINE

## 2025-07-09 PROCEDURE — 80076 HEPATIC FUNCTION PANEL: CPT

## 2025-07-09 PROCEDURE — 85025 COMPLETE CBC W/AUTO DIFF WBC: CPT

## 2025-07-09 PROCEDURE — 6370000000 HC RX 637 (ALT 250 FOR IP): Performed by: STUDENT IN AN ORGANIZED HEALTH CARE EDUCATION/TRAINING PROGRAM

## 2025-07-09 PROCEDURE — 80048 BASIC METABOLIC PNL TOTAL CA: CPT

## 2025-07-09 PROCEDURE — 2580000003 HC RX 258: Performed by: UROLOGY

## 2025-07-09 PROCEDURE — 1200000000 HC SEMI PRIVATE

## 2025-07-09 PROCEDURE — 0T174Z7 BYPASS LEFT URETER TO LEFT URETER, PERCUTANEOUS ENDOSCOPIC APPROACH: ICD-10-PCS | Performed by: UROLOGY

## 2025-07-09 PROCEDURE — 99233 SBSQ HOSP IP/OBS HIGH 50: CPT | Performed by: INTERNAL MEDICINE

## 2025-07-09 PROCEDURE — 0T778DZ DILATION OF LEFT URETER WITH INTRALUMINAL DEVICE, VIA NATURAL OR ARTIFICIAL OPENING ENDOSCOPIC: ICD-10-PCS | Performed by: UROLOGY

## 2025-07-09 PROCEDURE — 6360000002 HC RX W HCPCS: Performed by: INTERNAL MEDICINE

## 2025-07-09 PROCEDURE — 6370000000 HC RX 637 (ALT 250 FOR IP): Performed by: UROLOGY

## 2025-07-09 PROCEDURE — 6360000002 HC RX W HCPCS: Performed by: UROLOGY

## 2025-07-09 PROCEDURE — 0TP5X0Z REMOVAL OF DRAINAGE DEVICE FROM KIDNEY, EXTERNAL APPROACH: ICD-10-PCS | Performed by: UROLOGY

## 2025-07-09 PROCEDURE — 6370000000 HC RX 637 (ALT 250 FOR IP)

## 2025-07-09 PROCEDURE — 82570 ASSAY OF URINE CREATININE: CPT

## 2025-07-09 PROCEDURE — 8E0W4CZ ROBOTIC ASSISTED PROCEDURE OF TRUNK REGION, PERCUTANEOUS ENDOSCOPIC APPROACH: ICD-10-PCS | Performed by: UROLOGY

## 2025-07-09 PROCEDURE — 36415 COLL VENOUS BLD VENIPUNCTURE: CPT

## 2025-07-09 RX ORDER — METHOCARBAMOL 500 MG/1
500 TABLET, FILM COATED ORAL 3 TIMES DAILY PRN
Status: DISCONTINUED | OUTPATIENT
Start: 2025-07-09 | End: 2025-07-10 | Stop reason: HOSPADM

## 2025-07-09 RX ORDER — DIPHENHYDRAMINE HYDROCHLORIDE 50 MG/ML
25 INJECTION, SOLUTION INTRAMUSCULAR; INTRAVENOUS
Status: COMPLETED | OUTPATIENT
Start: 2025-07-09 | End: 2025-07-09

## 2025-07-09 RX ORDER — POLYETHYLENE GLYCOL 3350 17 G/17G
17 POWDER, FOR SOLUTION ORAL DAILY
Status: DISCONTINUED | OUTPATIENT
Start: 2025-07-09 | End: 2025-07-10 | Stop reason: HOSPADM

## 2025-07-09 RX ORDER — DOCUSATE SODIUM 100 MG/1
100 CAPSULE, LIQUID FILLED ORAL DAILY
Status: DISCONTINUED | OUTPATIENT
Start: 2025-07-09 | End: 2025-07-10 | Stop reason: HOSPADM

## 2025-07-09 RX ADMIN — CEFAZOLIN 2000 MG: 2 INJECTION, POWDER, FOR SOLUTION INTRAVENOUS at 22:21

## 2025-07-09 RX ADMIN — FAMOTIDINE 20 MG: 20 TABLET, FILM COATED ORAL at 21:38

## 2025-07-09 RX ADMIN — BUSPIRONE HYDROCHLORIDE 10 MG: 10 TABLET ORAL at 09:25

## 2025-07-09 RX ADMIN — ACETAMINOPHEN 650 MG: 325 TABLET ORAL at 21:38

## 2025-07-09 RX ADMIN — OXYBUTYNIN CHLORIDE 5 MG: 5 TABLET ORAL at 09:25

## 2025-07-09 RX ADMIN — FAMOTIDINE 20 MG: 20 TABLET, FILM COATED ORAL at 09:25

## 2025-07-09 RX ADMIN — OXYCODONE 10 MG: 5 TABLET ORAL at 18:03

## 2025-07-09 RX ADMIN — CEFAZOLIN 2000 MG: 2 INJECTION, POWDER, FOR SOLUTION INTRAVENOUS at 16:54

## 2025-07-09 RX ADMIN — KETOROLAC TROMETHAMINE 15 MG: 30 INJECTION, SOLUTION INTRAMUSCULAR at 11:02

## 2025-07-09 RX ADMIN — BUSPIRONE HYDROCHLORIDE 10 MG: 10 TABLET ORAL at 21:38

## 2025-07-09 RX ADMIN — ACETAMINOPHEN 650 MG: 325 TABLET ORAL at 05:13

## 2025-07-09 RX ADMIN — OXYCODONE 10 MG: 5 TABLET ORAL at 05:13

## 2025-07-09 RX ADMIN — CEFAZOLIN 2000 MG: 2 INJECTION, POWDER, FOR SOLUTION INTRAVENOUS at 09:44

## 2025-07-09 RX ADMIN — OXYBUTYNIN CHLORIDE 5 MG: 5 TABLET ORAL at 21:38

## 2025-07-09 RX ADMIN — DIPHENHYDRAMINE HYDROCHLORIDE 25 MG: 50 INJECTION INTRAMUSCULAR; INTRAVENOUS at 18:02

## 2025-07-09 RX ADMIN — POLYETHYLENE GLYCOL (3350) 17 G: 17 POWDER, FOR SOLUTION ORAL at 09:30

## 2025-07-09 RX ADMIN — OXYCODONE 10 MG: 5 TABLET ORAL at 00:16

## 2025-07-09 RX ADMIN — OXYCODONE 10 MG: 5 TABLET ORAL at 22:20

## 2025-07-09 RX ADMIN — Medication 1 LOZENGE: at 14:27

## 2025-07-09 RX ADMIN — OXYCODONE 10 MG: 5 TABLET ORAL at 11:02

## 2025-07-09 RX ADMIN — HYDROMORPHONE HYDROCHLORIDE 0.5 MG: 1 INJECTION, SOLUTION INTRAMUSCULAR; INTRAVENOUS; SUBCUTANEOUS at 14:23

## 2025-07-09 RX ADMIN — DOCUSATE SODIUM 100 MG: 100 CAPSULE, LIQUID FILLED ORAL at 09:25

## 2025-07-09 RX ADMIN — HYDROMORPHONE HYDROCHLORIDE 0.5 MG: 1 INJECTION, SOLUTION INTRAMUSCULAR; INTRAVENOUS; SUBCUTANEOUS at 09:05

## 2025-07-09 RX ADMIN — OXYBUTYNIN CHLORIDE 5 MG: 5 TABLET ORAL at 14:23

## 2025-07-09 RX ADMIN — KETOROLAC TROMETHAMINE 15 MG: 30 INJECTION, SOLUTION INTRAMUSCULAR at 21:38

## 2025-07-09 RX ADMIN — ACETAMINOPHEN 650 MG: 325 TABLET ORAL at 11:02

## 2025-07-09 RX ADMIN — BUSPIRONE HYDROCHLORIDE 10 MG: 10 TABLET ORAL at 14:23

## 2025-07-09 RX ADMIN — HYDROCHLOROTHIAZIDE 12.5 MG: 25 TABLET ORAL at 09:25

## 2025-07-09 RX ADMIN — METHOCARBAMOL TABLETS 500 MG: 500 TABLET, COATED ORAL at 16:52

## 2025-07-09 RX ADMIN — SENNOSIDES 8.6 MG: 8.6 TABLET, FILM COATED ORAL at 21:38

## 2025-07-09 RX ADMIN — HYDROMORPHONE HYDROCHLORIDE 0.5 MG: 1 INJECTION, SOLUTION INTRAMUSCULAR; INTRAVENOUS; SUBCUTANEOUS at 03:28

## 2025-07-09 ASSESSMENT — PAIN SCALES - WONG BAKER
WONGBAKER_NUMERICALRESPONSE: NO HURT
WONGBAKER_NUMERICALRESPONSE: NO HURT

## 2025-07-09 ASSESSMENT — PAIN - FUNCTIONAL ASSESSMENT

## 2025-07-09 ASSESSMENT — PAIN DESCRIPTION - LOCATION
LOCATION: ABDOMEN
LOCATION: GROIN;FLANK
LOCATION: ABDOMEN;VAGINA
LOCATION: THROAT
LOCATION: ABDOMEN
LOCATION: ABDOMEN;FLANK
LOCATION: ABDOMEN

## 2025-07-09 ASSESSMENT — PAIN SCALES - GENERAL
PAINLEVEL_OUTOF10: 10
PAINLEVEL_OUTOF10: 5
PAINLEVEL_OUTOF10: 8
PAINLEVEL_OUTOF10: 7
PAINLEVEL_OUTOF10: 8
PAINLEVEL_OUTOF10: 6
PAINLEVEL_OUTOF10: 9
PAINLEVEL_OUTOF10: 8
PAINLEVEL_OUTOF10: 6
PAINLEVEL_OUTOF10: 5
PAINLEVEL_OUTOF10: 8
PAINLEVEL_OUTOF10: 8
PAINLEVEL_OUTOF10: 7

## 2025-07-09 ASSESSMENT — PAIN DESCRIPTION - DESCRIPTORS
DESCRIPTORS: DISCOMFORT
DESCRIPTORS: DISCOMFORT
DESCRIPTORS: SHARP;OTHER (COMMENT)
DESCRIPTORS: GNAWING;SHARP
DESCRIPTORS: DISCOMFORT
DESCRIPTORS: SHARP;BURNING
DESCRIPTORS: SORE;ACHING;BURNING
DESCRIPTORS: TEARING
DESCRIPTORS: DISCOMFORT;BURNING
DESCRIPTORS: DISCOMFORT

## 2025-07-09 ASSESSMENT — PAIN DESCRIPTION - FREQUENCY: FREQUENCY: CONTINUOUS

## 2025-07-09 ASSESSMENT — PAIN DESCRIPTION - PAIN TYPE: TYPE: ACUTE PAIN

## 2025-07-09 ASSESSMENT — PAIN DESCRIPTION - ONSET: ONSET: ON-GOING

## 2025-07-09 NOTE — PROGRESS NOTES
Progress Note    Admit Date:  7/7/2025    40 y.o. female who presented to [facility] with scheduled outpatient laparoscopic hysterectomy with bilateral salpingectomy as well as cystoscopy.  Patient had presented due to abnormal uterine bleeding of note has under lying endometriosis and uterine she was also involved for left ureteral injury.     Hospitalist consult for post op pain      Plan for left ureteral reimplantation today     Subjective:  Ms. Patel today seen resting in bed. Reports she is still having considerable pain but not as bad as yesterday. No BM yet but says hasn't eaten much. She had muscle spasms yesterday.     Objective:   Patient Vitals for the past 4 hrs:   BP Temp Temp src Pulse Resp SpO2   07/09/25 1415 116/70 98.7 °F (37.1 °C) Oral 67 18 97 %          Intake/Output Summary (Last 24 hours) at 7/9/2025 1739  Last data filed at 7/9/2025 1656  Gross per 24 hour   Intake 2850 ml   Output 4050 ml   Net -1200 ml       Physical Exam:    Gen: No distress. Alert. +Pleasant female   Eyes: PERRL. No sclera icterus. No conjunctival injection.   Neck: No JVD.  Trachea midline.  Resp: No accessory muscle use. No crackles. No wheezes. No rhonchi.   CV: Regular rate. Regular rhythm. No murmur.  No rub. No edema.   Peripheral Pulses: +2 palpable, equal bilaterally   GI:  Non-distended.  Normal bowel sounds.+Diffuse tenderness to abdomen, multiple incision sites,CATE drain    Skin: Warm and dry. No nodule on exposed extremities. No rash on exposed extremities.   M/S: No cyanosis. No joint deformity. No clubbing.   Neuro: Awake. Grossly nonfocal    Psych: Oriented x 3. No anxiety or agitation.         Medications:  polyethylene glycol, 17 g, Daily  docusate sodium, 100 mg, Daily  busPIRone, 10 mg, TID  [Held by provider] hydroCHLOROthiazide, 12.5 mg, Daily  ceFAZolin, 2,000 mg, q8h  senna, 1 tablet, Nightly  acetaminophen, 650 mg, Q8H  sodium chloride flush, 5-40 mL, 2 times per day  famotidine, 20 mg, BID

## 2025-07-09 NOTE — OP NOTE
[address] [address]                            OPERATIVE REPORT      PATIENT NAME: NADJA ELLIS            : 1985  MED REC NO: [id number]                      ROOM: 0222  ACCOUNT NO: [id number]                       ADMIT DATE: 2025  PROVIDER: Juan Alberto Gil MD      DATE OF PROCEDURE:  2025    SURGEON:  Juan Alberto Gil MD    PREOPERATIVE DIAGNOSIS:  Left ureteral injury.  Nephrostomy tube removal.    POSTOPERATIVE DIAGNOSIS:  Left ureteral injury.  Nephrostomy tube removal.    OPERATIONS:    1. Robotic-assisted left ureteral reimplantation with double-J stent placement.  2. Left nephrostomy tube removal.    ANESTHESIA:  General.    ANESTHESIOLOGIST:  Dr. Richards.    INDICATIONS:  The patient is a 40-year-old who was undergoing hysterectomy yesterday.  It was realized there was a ureteral injury.  There were attempts to place a stent, they were was unsuccessful and she had a nephrostomy tube placed on the left side, it was left to gravity drainage.  She was observed overnight and she was planned to explore the abdomen to try to repair her ureter.  She signed the consent prior to surgery.    DESCRIPTION OF PROCEDURE:  The patient was brought to the operating theater and anesthesia was induced.  Her previous drain was removed and the abdominal area in her lower abdomen was cleaned, prepped and draped in usual sterile fashion including a vaginal pelvic area.  She was positioned in dorsal lithotomy, protecting all bony prominences.  We deployed a Mckenna catheter to gravity sterilely on the field and then we used a Veress access at the original port site just above the umbilicus. Drop test negative and the opening insufflation pressures were low.  Then, we insufflated the abdomen and then deployed a robotic trocar.  We inspected with the camera.  No inadvertent injuries or lesions were seen.  We inspected all

## 2025-07-09 NOTE — PROGRESS NOTES
Pt requesting pain medication for surgical pain rating 8/10. PRN  pain medication given, see MAR. SR up x2, Call light and bedside table in easy reach.  Denies any other needs at this time.

## 2025-07-09 NOTE — PROGRESS NOTES
Handoff report and transfer of care given at bedside to  Norma RN.  Patient in stable condition, denies needs/concerns at this time.  Call light within reach.

## 2025-07-09 NOTE — PROGRESS NOTES
Urology Progress Note      Subjective: Jania Patel has expected post op pain    Vitals:  BP (!) 106/43   Pulse 60   Temp 99.3 °F (37.4 °C) (Oral)   Resp 19   Ht 1.753 m (5' 9\")   Wt 73 kg (161 lb) Comment: unable to weigh d/t pt. in extreme pain  SpO2 100%   BMI 23.78 kg/m²   Temp  Av.2 °F (36.8 °C)  Min: 97.8 °F (36.6 °C)  Max: 99.3 °F (37.4 °C)    Intake/Output Summary (Last 24 hours) at 2025 0831  Last data filed at 2025 0358  Gross per 24 hour   Intake 2100 ml   Output 2845 ml   Net -745 ml       Exam:   Physical:  Well developed, well nourished in no acute distress  Mood indicates no abnormalities. Pt doesn’t appear depressed  Orientated to time and place  Abd soft, surgical incisions are CDI.  Mckenna with yellow tint urine, left abd drain serosanguinous output .  LEFT NT IS REMOVED, incision is Clean and dry    Labs:  WBC:    Lab Results   Component Value Date/Time    WBC 6.3 2025 08:30 AM     Hemoglobin/Hematocrit:    Lab Results   Component Value Date/Time    HGB 13.9 2025 08:30 AM    HCT 40.5 2025 08:30 AM     BMP:    Lab Results   Component Value Date/Time     2025 08:14 AM    K 3.7 2025 08:14 AM    K 4.0 2025 08:30 AM     2025 08:14 AM    CO2 24 2025 08:14 AM    BUN 12 2025 08:14 AM    CREATININE 0.6 2025 08:14 AM    CALCIUM 7.5 2025 08:14 AM    GFRAA >60 2022 04:32 AM    LABGLOM >90 2025 08:14 AM     PT/INR:    Lab Results   Component Value Date/Time    PROTIME 10.6 2021 05:06 PM    INR 0.94 2021 05:06 PM           Impression/Plan:     #Left ureteral injury   - s/p robotic assisted total laparoscopic hysterectomy with bilateral salpingectomy and left oophorectomy with OBGYN complicated by left ureteral injury on 25  - left nephrostomy tube was placed by IR on 25   - s/p robotic assisted left ureteral reimplantation and ureteral stent insertion, nephrostomy tube

## 2025-07-09 NOTE — FLOWSHEET NOTE
07/09/25 0905   Vital Signs   Temp 98.7 °F (37.1 °C)   Temp Source Oral   Pulse 65   Heart Rate Source Monitor   Respirations 18   /75   MAP (Calculated) 91   BP Location Left upper arm   BP Method Automatic   Patient Position Semi wlers   Pain Assessment   Pain Assessment 0-10   Pain Level 10   Pain Location Abdomen;Flank   Pain Descriptors Sharp;Burning   Functional Pain Assessment Activities are not prevented   Non-Pharmaceutical Pain Intervention(s) Rest;Repositioned   Opioid-Induced Sedation   POSS Score 1   Oxygen Therapy   SpO2 98 %   O2 Device None (Room air)     AM assessment completed. No complaints of pain or discomfort voiced. No signs or symptoms of distress noted. Patient tolerated AM medications well. Respirations easy and even. Bed in lowest position, bed rails x2 up,  Call light within reach.     Bedside Mobility Assessment Tool (BMAT):     Assessment Level 1- Sit and Shake    1. From a semi-reclined position, ask patient to sit up and rotate to a seated position at the side of the bed. Can use the bedrail.    2. Ask patient to reach out and grab your hand and shake making sure patient reaches across his/her midline.   Pass- Patient is able to come to a seated position, maintain core strength. Maintains seated balance while reaching across midline. Move on to Assessment Level 2.     Assessment Level 2- Stretch and Point   1. With patient in seated position at the side of the bed, have patient place both feet on the floor (or stool) with knees no higher than hips.    2. Ask patient to stretch one leg and straighten the knee, then bend the ankle/flex and point the toes. If appropriate, repeat with the other leg.   Pass- Patient is able to demonstrate appropriate quad strength on intended weight bearing limb(s). Move onto Assessment Level 3.     Assessment Level 3- Stand   1. Ask patient to elevate off the bed or chair (seated to standing) using an assistive device (cane, bedrail).    2.

## 2025-07-09 NOTE — PLAN OF CARE
Problem: Discharge Planning  Goal: Discharge to home or other facility with appropriate resources  7/9/2025 1142 by Norma Duarte RN  Outcome: Progressing     Problem: Pain  Goal: Verbalizes/displays adequate comfort level or baseline comfort level  7/9/2025 1142 by Norma Duarte RN  Outcome: Progressing     Problem: Gastrointestinal - Adult  Goal: Minimal or absence of nausea and vomiting  7/9/2025 1142 by Norma Duarte RN  Outcome: Progressing     Problem: Genitourinary - Adult  Goal: Absence of urinary retention  7/9/2025 1142 by Norma Duarte RN  Outcome: Progressing     Problem: Safety - Adult  Goal: Free from fall injury  Outcome: Progressing

## 2025-07-09 NOTE — PROGRESS NOTES
Patient transported to room 222, report given to SUMEET Velez with all questions answered. Patient tolerating ice chips, pain stated as 3/10, and VSS.

## 2025-07-09 NOTE — PROGRESS NOTES
OBGYN Attending Progress Note  POD#2 s/p ROBOTIC ASSISTED TOTAL LAPAROSCOPIC HYSTERECTOMY, BILATERAL SALPINGECTOMY, LEFT OOPHRECTOMY, ENDOMETRIOSIS EXCISION, CYSTOSCOPY, left nephrectomy tube placement by IR, POD#1 Left  ureteral reimplantation with double-J stent placement, left nephrostomy tube removal   EBL 50cc      S: Reports pain controlled with dilaudid, oxycodone 10, tylenol, toradol. Tolerating PO. Ambulating, Catheter in place, JJ drain with serosanginous fluid. Overnight left drain about 30cc serosang, kenneth fluid 1650 cc in page output. Passing gas. No BM yet.      O: /75   Pulse 65   Temp 98.7 °F (37.1 °C) (Oral)   Resp 18   Ht 1.753 m (5' 9\")   Wt 73 kg (161 lb) Comment: unable to weigh d/t pt. in extreme pain  SpO2 98%   BMI 23.78 kg/m²   General: AOx3  Cardiac: RRR  Pulm: CTAB  Abd: soft, non distended, Mild pain mostly in left side, BS+, incision port sites x5, blisters on abdomen likely from tegaderm. Dermabond in place  Ext: nontender, trace edema,        A/P: POD#2 s/p ROBOTIC ASSISTED TOTAL LAPAROSCOPIC HYSTERECTOMY, BILATERAL SALPINGECTOMY, LEFT OOPHRECTOMY, ENDOMETRIOSIS EXCISION, CYSTOSCOPY, left nephrectomy tube placement by IR, POD#1 Left  ureteral reimplantation with double-J stent placement, left nephrostomy tube removal   1. Recovering well  2. Advance diet as tolerated  3. Cont routine PO care  4. Continue strict I/O  5. Discuss with urology pain management regimen if we can switch to PO Ibuprofen/tylenol scheduled with oxycodone 5/10 PRN  6. Rest of care per urology, from GYN standpoint recovering well, discharge will be dependant on Urology recommendation.     Gabby Schwartz MD

## 2025-07-09 NOTE — WOUND CARE
Asked by nursing to assess blisters on abdomen secondary to tape. No tape on abdomen now.  Lap site near umbilicus intact.  Multiple intact clear fluid filled blisters present.  One ruptured and dry area.  Mepilex applied to ruptured blister.  Keep in place for a few days.  Apply Mepilex if blisters rupture.  Skin prep applied to intact blisters.  Skin prep provided to patient and educated.  She verbalizes an understanding.  Call for worsening or concerns.

## 2025-07-09 NOTE — PLAN OF CARE
Problem: Discharge Planning  Goal: Discharge to home or other facility with appropriate resources  Outcome: Progressing     Problem: Pain  Goal: Verbalizes/displays adequate comfort level or baseline comfort level  Outcome: Progressing     Problem: Gastrointestinal - Adult  Goal: Minimal or absence of nausea and vomiting  Outcome: Progressing     Problem: Genitourinary - Adult  Goal: Absence of urinary retention  Outcome: Progressing

## 2025-07-09 NOTE — ANESTHESIA POSTPROCEDURE EVALUATION
Department of Anesthesiology  Postprocedure Note    Patient: Jania Patel  MRN: [id number]  YOB: 1985  Date of evaluation: 7/8/2025    Procedure Summary       Date: 07/08/25 Room / Location: [address]    Anesthesia Start: 1601 Anesthesia Stop: 2022    Procedure: LEFT URETERAL REIMPLANTATION LAPAROSCOPIC ROBOTIC (Left: Abdomen) Diagnosis:       Ureter injury, initial encounter      (Ureter injury, initial encounter [S37.10XA])    Surgeons: Juan Alberto Gil MD Responsible Provider: Thomas Richards MD    Anesthesia Type: general ASA Status: 2            Anesthesia Type: No value filed.    Sudhir Phase I: Sudhir Score: 10    Sudhir Phase II:      Anesthesia Post Evaluation    Comments: Postoperative Anesthesia Note    Name:    Jania Patel  MRN:      [id number]    Patient Vitals in the past 12 hrs:  07/08/25 2130, BP:117/70, Temp:98.1 °F (36.7 °C), Temp src:Axillary, Pulse:80, Resp:21, SpO2:98 %  07/08/25 2115, BP:115/72, Pulse:72, Resp:10, SpO2:99 %  07/08/25 2100, BP:122/81, Pulse:69, Resp:18, SpO2:98 %  07/08/25 2045, BP:122/77, Pulse:72, Resp:16, SpO2:99 %  07/08/25 2035, BP:117/69, Pulse:72, Resp:14, SpO2:98 %  07/08/25 2030, BP:124/70, Pulse:76, Resp:16, SpO2:98 %  07/08/25 2025, BP:(!) 90/56, Temp:97.8 °F (36.6 °C), Temp src:Axillary, Pulse:76, Resp:16, SpO2:98 %  07/08/25 2022, BP:137/81, Pulse:95, Resp:19, SpO2:97 %  07/08/25 1230, Pulse:53  07/08/25 1227, BP:127/65, Temp:98.2 °F (36.8 °C), Temp src:Oral, Pulse:53, Resp:18, SpO2:99 %  07/08/25 1117, Resp:18     LABS:    CBC  Lab Results       Component                Value               Date/Time                  WBC                      6.3                 07/07/2025 08:30 AM        HGB                      13.9                07/07/2025 08:30 AM        HCT                      40.5                07/07/2025 08:30 AM        PLT                      201                 07/07/2025 08:30 AM   RENAL  Lab

## 2025-07-10 ENCOUNTER — HOSPITAL ENCOUNTER (EMERGENCY)
Age: 40
Discharge: HOME | End: 2025-07-10
Attending: EMERGENCY MEDICINE
Payer: COMMERCIAL

## 2025-07-10 VITALS
OXYGEN SATURATION: 96 % | WEIGHT: 161 LBS | HEIGHT: 69 IN | BODY MASS INDEX: 23.85 KG/M2 | TEMPERATURE: 98 F | RESPIRATION RATE: 19 BRPM | SYSTOLIC BLOOD PRESSURE: 134 MMHG | HEART RATE: 82 BPM | DIASTOLIC BLOOD PRESSURE: 75 MMHG

## 2025-07-10 VITALS
TEMPERATURE: 98.6 F | OXYGEN SATURATION: 97 % | BODY MASS INDEX: 23.85 KG/M2 | SYSTOLIC BLOOD PRESSURE: 116 MMHG | DIASTOLIC BLOOD PRESSURE: 67 MMHG | RESPIRATION RATE: 18 BRPM | HEART RATE: 70 BPM | WEIGHT: 161 LBS | HEIGHT: 69 IN

## 2025-07-10 DIAGNOSIS — R31.0 GROSS HEMATURIA: ICD-10-CM

## 2025-07-10 DIAGNOSIS — T83.9XXA FOLEY CATHETER PROBLEM, INITIAL ENCOUNTER: Primary | ICD-10-CM

## 2025-07-10 LAB
ANION GAP SERPL CALCULATED.3IONS-SCNC: 11 MMOL/L (ref 3–16)
BUN SERPL-MCNC: 6 MG/DL (ref 7–20)
CALCIUM SERPL-MCNC: 7.6 MG/DL (ref 8.3–10.6)
CHLORIDE SERPL-SCNC: 106 MMOL/L (ref 99–110)
CO2 SERPL-SCNC: 23 MMOL/L (ref 21–32)
CREAT SERPL-MCNC: 0.7 MG/DL (ref 0.6–1.1)
GFR SERPLBLD CREATININE-BSD FMLA CKD-EPI: >90 ML/MIN/{1.73_M2}
GLUCOSE SERPL-MCNC: 84 MG/DL (ref 70–99)
MAGNESIUM SERPL-MCNC: 1.78 MG/DL (ref 1.8–2.4)
POTASSIUM SERPL-SCNC: 3.5 MMOL/L (ref 3.5–5.1)
SODIUM SERPL-SCNC: 140 MMOL/L (ref 136–145)

## 2025-07-10 PROCEDURE — 2580000003 HC RX 258: Performed by: UROLOGY

## 2025-07-10 PROCEDURE — 6370000000 HC RX 637 (ALT 250 FOR IP): Performed by: STUDENT IN AN ORGANIZED HEALTH CARE EDUCATION/TRAINING PROGRAM

## 2025-07-10 PROCEDURE — 80048 BASIC METABOLIC PNL TOTAL CA: CPT

## 2025-07-10 PROCEDURE — 36415 COLL VENOUS BLD VENIPUNCTURE: CPT

## 2025-07-10 PROCEDURE — 83735 ASSAY OF MAGNESIUM: CPT

## 2025-07-10 PROCEDURE — 6370000000 HC RX 637 (ALT 250 FOR IP): Performed by: UROLOGY

## 2025-07-10 PROCEDURE — 99282 EMERGENCY DEPT VISIT SF MDM: CPT

## 2025-07-10 PROCEDURE — 6370000000 HC RX 637 (ALT 250 FOR IP)

## 2025-07-10 PROCEDURE — 6360000002 HC RX W HCPCS: Performed by: UROLOGY

## 2025-07-10 RX ORDER — ACETAMINOPHEN 325 MG/1
650 TABLET ORAL EVERY 6 HOURS
Qty: 120 TABLET | Refills: 0 | Status: SHIPPED | OUTPATIENT
Start: 2025-07-10 | End: 2025-07-25

## 2025-07-10 RX ORDER — OXYBUTYNIN CHLORIDE 5 MG/1
5 TABLET ORAL 3 TIMES DAILY
Qty: 90 TABLET | Refills: 0 | Status: SHIPPED | OUTPATIENT
Start: 2025-07-10 | End: 2025-08-09

## 2025-07-10 RX ORDER — IBUPROFEN 600 MG/1
600 TABLET, FILM COATED ORAL EVERY 6 HOURS
Qty: 60 TABLET | Refills: 0 | Status: SHIPPED | OUTPATIENT
Start: 2025-07-10 | End: 2025-07-25

## 2025-07-10 RX ORDER — SIMETHICONE 80 MG
80 TABLET,CHEWABLE ORAL 4 TIMES DAILY PRN
Qty: 60 TABLET | Refills: 0 | Status: SHIPPED | OUTPATIENT
Start: 2025-07-10

## 2025-07-10 RX ORDER — OXYCODONE HYDROCHLORIDE 5 MG/1
5 TABLET ORAL EVERY 6 HOURS PRN
Qty: 20 TABLET | Refills: 0 | Status: SHIPPED | OUTPATIENT
Start: 2025-07-10 | End: 2025-07-16

## 2025-07-10 RX ORDER — DOCUSATE SODIUM 100 MG/1
100 CAPSULE, LIQUID FILLED ORAL 2 TIMES DAILY
Qty: 60 CAPSULE | Refills: 0 | Status: SHIPPED | OUTPATIENT
Start: 2025-07-10 | End: 2025-08-09

## 2025-07-10 RX ADMIN — KETOROLAC TROMETHAMINE 15 MG: 30 INJECTION, SOLUTION INTRAMUSCULAR at 09:55

## 2025-07-10 RX ADMIN — DOCUSATE SODIUM 100 MG: 100 CAPSULE, LIQUID FILLED ORAL at 09:55

## 2025-07-10 RX ADMIN — FAMOTIDINE 20 MG: 20 TABLET, FILM COATED ORAL at 09:55

## 2025-07-10 RX ADMIN — OXYCODONE 10 MG: 5 TABLET ORAL at 06:41

## 2025-07-10 RX ADMIN — BUSPIRONE HYDROCHLORIDE 10 MG: 10 TABLET ORAL at 09:55

## 2025-07-10 RX ADMIN — OXYCODONE 10 MG: 5 TABLET ORAL at 02:46

## 2025-07-10 RX ADMIN — OXYCODONE 10 MG: 5 TABLET ORAL at 11:15

## 2025-07-10 RX ADMIN — OXYBUTYNIN CHLORIDE 5 MG: 5 TABLET ORAL at 09:55

## 2025-07-10 RX ADMIN — CEFAZOLIN 2000 MG: 2 INJECTION, POWDER, FOR SOLUTION INTRAVENOUS at 10:01

## 2025-07-10 RX ADMIN — POLYETHYLENE GLYCOL (3350) 17 G: 17 POWDER, FOR SOLUTION ORAL at 09:55

## 2025-07-10 RX ADMIN — ACETAMINOPHEN 650 MG: 325 TABLET ORAL at 11:15

## 2025-07-10 RX ADMIN — ACETAMINOPHEN 650 MG: 325 TABLET ORAL at 05:45

## 2025-07-10 ASSESSMENT — PAIN DESCRIPTION - LOCATION
LOCATION: ABDOMEN
LOCATION: ABDOMEN;FLANK
LOCATION: ABDOMEN

## 2025-07-10 ASSESSMENT — PAIN SCALES - GENERAL
PAINLEVEL_OUTOF10: 6
PAINLEVEL_OUTOF10: 7
PAINLEVEL_OUTOF10: 6
PAINLEVEL_OUTOF10: 7
PAINLEVEL_OUTOF10: 6
PAINLEVEL_OUTOF10: 0

## 2025-07-10 ASSESSMENT — PAIN DESCRIPTION - FREQUENCY: FREQUENCY: CONTINUOUS

## 2025-07-10 ASSESSMENT — PAIN DESCRIPTION - PAIN TYPE
TYPE: ACUTE PAIN
TYPE: SURGICAL PAIN

## 2025-07-10 ASSESSMENT — PAIN DESCRIPTION - DESCRIPTORS
DESCRIPTORS: DISCOMFORT
DESCRIPTORS: BURNING;SHARP
DESCRIPTORS: BURNING;SHARP
DESCRIPTORS: DISCOMFORT

## 2025-07-10 ASSESSMENT — PAIN DESCRIPTION - ONSET: ONSET: ON-GOING

## 2025-07-10 ASSESSMENT — PAIN - FUNCTIONAL ASSESSMENT
PAIN_FUNCTIONAL_ASSESSMENT: 0-10
PAIN_FUNCTIONAL_ASSESSMENT: ACTIVITIES ARE NOT PREVENTED
PAIN_FUNCTIONAL_ASSESSMENT: PREVENTS OR INTERFERES SOME ACTIVE ACTIVITIES AND ADLS
PAIN_FUNCTIONAL_ASSESSMENT: PREVENTS OR INTERFERES SOME ACTIVE ACTIVITIES AND ADLS
PAIN_FUNCTIONAL_ASSESSMENT: ACTIVITIES ARE NOT PREVENTED

## 2025-07-10 NOTE — PROGRESS NOTES
Shift assessment complete; see flow sheet.  Scheduled medications administered; See MAR.  IV infusing without difficulty.  Pt denies pain at this time. Pt denies any needs at this time. Pt educated on use of call light and to call out with needs, verbalized understanding, bed in low locked position for pt safety

## 2025-07-10 NOTE — PROGRESS NOTES
Mckenna bag changed to leg bag, pt and spouse educated on how to properly change drainage bags, empty , and how to use the proper aseptic technique to prevent infections.

## 2025-07-10 NOTE — PLAN OF CARE
Problem: Discharge Planning  Goal: Discharge to home or other facility with appropriate resources  7/10/2025 1311 by Norma Duarte RN  Outcome: Adequate for Discharge  7/10/2025 0351 by Loyda Conway RN  Outcome: Progressing     Problem: Pain  Goal: Verbalizes/displays adequate comfort level or baseline comfort level  7/10/2025 1311 by Norma Duarte RN  Outcome: Adequate for Discharge  7/10/2025 0351 by Loyda Conway RN  Outcome: Progressing     Problem: Gastrointestinal - Adult  Goal: Minimal or absence of nausea and vomiting  7/10/2025 1311 by Norma Duarte RN  Outcome: Adequate for Discharge  7/10/2025 0351 by Loyda Conway RN  Outcome: Progressing     Problem: Genitourinary - Adult  Goal: Absence of urinary retention  7/10/2025 1311 by Norma Duarte RN  Outcome: Adequate for Discharge  7/10/2025 0351 by Loyda Conway RN  Outcome: Progressing     Problem: Safety - Adult  Goal: Free from fall injury  7/10/2025 1311 by Norma Duarte RN  Outcome: Adequate for Discharge  7/10/2025 0351 by Loyda Conway RN  Outcome: Progressing

## 2025-07-10 NOTE — PLAN OF CARE
Problem: Discharge Planning  Goal: Discharge to home or other facility with appropriate resources  Outcome: Progressing     Problem: Pain  Goal: Verbalizes/displays adequate comfort level or baseline comfort level  Outcome: Progressing     Problem: Gastrointestinal - Adult  Goal: Minimal or absence of nausea and vomiting  Outcome: Progressing     Problem: Genitourinary - Adult  Goal: Absence of urinary retention  Outcome: Progressing     Problem: Safety - Adult  Goal: Free from fall injury  Outcome: Progressing

## 2025-07-10 NOTE — DISCHARGE INSTRUCTIONS
Call Elif Mcnulty to arrange your cystogram in the morning on 7/18/25 before your afternoon appt with Dr Gil

## 2025-07-10 NOTE — PROGRESS NOTES
Urology Progress Note      Subjective: Jania Patel is feeling better today     Vitals:  BP (!) 145/72   Pulse 62   Temp 98.3 °F (36.8 °C) (Oral)   Resp 18   Ht 1.753 m (5' 9\")   Wt 73 kg (161 lb) Comment: unable to weigh d/t pt. in extreme pain  SpO2 100%   BMI 23.78 kg/m²   Temp  Av.6 °F (37 °C)  Min: 98.3 °F (36.8 °C)  Max: 98.7 °F (37.1 °C)    Intake/Output Summary (Last 24 hours) at 7/10/2025 1021  Last data filed at 7/10/2025 0626  Gross per 24 hour   Intake 500 ml   Output 2460 ml   Net -1960 ml       Exam:   Physical:  Well developed, well nourished in no acute distress  Mood indicates no abnormalities. Pt doesn’t appear depressed  Orientated to time and place  Abd soft, surgical incisions are CDI.  Mckenna with yellow tint urine, left abd drain serosanguinous output - removed during exam today.  LEFT NT IS REMOVED, incision is Clean and dry    Labs:  WBC:    Lab Results   Component Value Date/Time    WBC 7.3 2025 06:03 AM     Hemoglobin/Hematocrit:    Lab Results   Component Value Date/Time    HGB 10.9 2025 06:03 AM    HCT 32.4 2025 06:03 AM     BMP:    Lab Results   Component Value Date/Time     07/10/2025 09:33 AM    K 3.5 07/10/2025 09:33 AM     07/10/2025 09:33 AM    CO2 23 07/10/2025 09:33 AM    BUN 6 07/10/2025 09:33 AM    CREATININE 0.7 07/10/2025 09:33 AM    CALCIUM 7.6 07/10/2025 09:33 AM    GFRAA >60 2022 04:32 AM    LABGLOM >90 07/10/2025 09:33 AM     PT/INR:    Lab Results   Component Value Date/Time    PROTIME 10.6 2021 05:06 PM    INR 0.94 2021 05:06 PM           Impression/Plan:     #Left ureteral injury   - s/p robotic assisted total laparoscopic hysterectomy with bilateral salpingectomy and left oophorectomy with OBGYN complicated by left ureteral injury on 25  - left nephrostomy tube was placed by IR on 25   - s/p robotic assisted left ureteral reimplantation and ureteral stent insertion, nephrostomy tube removal

## 2025-07-10 NOTE — DISCHARGE SUMMARY
oxyBUTYnin 5 MG tablet  Commonly known as: DITROPAN  Take 1 tablet by mouth 3 times daily     oxyCODONE 5 MG immediate release tablet  Commonly known as: Roxicodone  Take 1 tablet by mouth every 6 hours as needed for Pain for up to 20 doses. Intended supply: 3 days. Take lowest dose possible to manage pain Max Daily Amount: 20 mg     simethicone 80 MG chewable tablet  Commonly known as: MYLICON  Take 1 tablet by mouth 4 times daily as needed for Flatulence            CHANGE how you take these medications      ibuprofen 600 MG tablet  Commonly known as: ADVIL;MOTRIN  Take 1 tablet by mouth every 6 hours for 60 doses  What changed:   when to take this  reasons to take this            CONTINUE taking these medications      busPIRone 10 MG tablet  Commonly known as: BUSPAR     hydroCHLOROthiazide 12.5 MG tablet     omeprazole 20 MG delayed release capsule  Commonly known as: PRILOSEC     VITAMIN D BOOSTER PO            STOP taking these medications      HYDROcodone-acetaminophen 5-325 MG per tablet  Commonly known as: NORCO               Where to Get Your Medications        These medications were sent to [address] - P [phone] - F [phone]  [address] [place] [id number]      Phone: [phone]   acetaminophen 325 MG tablet  docusate sodium 100 MG capsule  ibuprofen 600 MG tablet  oxyBUTYnin 5 MG tablet  oxyCODONE 5 MG immediate release tablet  simethicone 80 MG chewable tablet            Discharge Procedure Orders   FL CYSTOGRAM INJECTION   Standing Status: Future Standing Exp. Date: 07/27/26     Order Specific Question Answer Comments   Reason for exam: check for urine leak or obstruction- OK to remove page if NO LEAK NOTED .  Call Dr Gil with questions      Counseled to avoid sexual intercourse, no heavy lifting, return if s/s of infection, severe pain or bleeding, take showers but avoid baths/pools. Home with leg bag and follow up

## 2025-07-10 NOTE — PROGRESS NOTES
Pt given written and verbal discharge instructions. Pt indicated understanding of home medication and care instructions. Prescriptions sent  to pt preferred pharmacy. Pt packed own belongings. Pt self ambulated  down to family car via wheelchair.

## 2025-07-10 NOTE — FLOWSHEET NOTE
07/10/25 0945   Vital Signs   Temp 98.3 °F (36.8 °C)   Temp Source Oral   Pulse 62   Heart Rate Source Monitor   Respirations 18   BP (!) 145/72   MAP (Calculated) 96   BP Location Left upper arm   BP Method Automatic   Patient Position Semi fowlers   Pain Assessment   Pain Assessment 0-10   Pain Level 6   Patient's Stated Pain Goal 0 - No pain   Pain Location Abdomen;Flank   Pain Descriptors Burning;Sharp   Functional Pain Assessment Prevents or interferes some active activities and ADLs   Pain Type Surgical pain   Non-Pharmaceutical Pain Intervention(s) Rest;Repositioned   Opioid-Induced Sedation   POSS Score 1   Oxygen Therapy   SpO2 100 %   O2 Device None (Room air)     AM assessment completed.  No signs or symptoms of distress noted. Patient tolerated AM medications well. Respirations easy and even. Bed in lowest position, bed rails x2 up,  Call light within reach.     Bedside Mobility Assessment Tool (BMAT):     Assessment Level 1- Sit and Shake    1. From a semi-reclined position, ask patient to sit up and rotate to a seated position at the side of the bed. Can use the bedrail.    2. Ask patient to reach out and grab your hand and shake making sure patient reaches across his/her midline.   Pass- Patient is able to come to a seated position, maintain core strength. Maintains seated balance while reaching across midline. Move on to Assessment Level 2.     Assessment Level 2- Stretch and Point   1. With patient in seated position at the side of the bed, have patient place both feet on the floor (or stool) with knees no higher than hips.    2. Ask patient to stretch one leg and straighten the knee, then bend the ankle/flex and point the toes. If appropriate, repeat with the other leg.   Pass- Patient is able to demonstrate appropriate quad strength on intended weight bearing limb(s). Move onto Assessment Level 3.     Assessment Level 3- Stand   1. Ask patient to elevate off the bed or chair (seated to

## 2025-07-11 NOTE — ED PROVIDER NOTES
[facility] EMERGENCY DEPARTMENT  EMERGENCY DEPARTMENTENCOUNTER      Pt Name: Jania Patel  MRN: [id number]  Birthdate 1985  Date ofevaluation: 7/10/2025  Provider: Bertha Shannon MD    CHIEF COMPLAINT       Chief Complaint   Patient presents with    Post-op Problem     Pt had hysterectomy here at [facility] Monday. Pt states they nicked her ureter so Urology had to place a stent and catheter Tuesday. Pt states her catheter isn't draining properly.         HISTORY OF PRESENT ILLNESS   (Location/Symptom, Timing/Onset,Context/Setting, Quality, Duration, Modifying Factors, Severity)  Note limiting factors.   Jania Patel is a 40 y.o. female  who  has a past medical history of Chronic back pain, GERD (gastroesophageal reflux disease), Malignant melanoma, Painful orthopaedic hardware, and Wears glasses.    who presents to the emergency department complaining of her Mckenna catheter not draining.  She states she last emptied it this evening around 1700.  Patient states that she had a hysterectomy on Monday and they accidentally nicked her ureter so urology had to place a stent and a catheter on Tuesday.  She states she was discharged from the hospital today.  They had switched her bag to a leg bag and when she got home she switched the leg bag to a normal bag.  She states now it is not draining and she is concerned there is some air bubbles in it.  She states that she is getting uncomfortable due to her bladder being distended.  She states she still has some mild pain from the surgery that is about the same as it was.  She also has some blisters on her abdomen from a reaction to the adhesive.  She states she took her pain medication about an hour prior to coming in.  Nothing specific has made her symptoms better or worse.  She denies fever.  History obtained from the patient.  She presents with acute illness.      NursingNotes were reviewed.    REVIEW OF SYSTEMS    (2-9 systems for level 4, 10 or more

## 2025-07-12 ENCOUNTER — HOSPITAL ENCOUNTER (EMERGENCY)
Age: 40
Discharge: HOME | End: 2025-07-13
Payer: COMMERCIAL

## 2025-07-12 ENCOUNTER — APPOINTMENT (OUTPATIENT)
Dept: CT IMAGING | Age: 40
End: 2025-07-12
Payer: COMMERCIAL

## 2025-07-12 DIAGNOSIS — G89.18 POST-OPERATIVE PAIN: Primary | ICD-10-CM

## 2025-07-12 LAB
ALBUMIN SERPL-MCNC: 3.7 G/DL (ref 3.4–5)
ALBUMIN/GLOB SERPL: 1.5 {RATIO} (ref 1.1–2.2)
ALP SERPL-CCNC: 49 U/L (ref 40–129)
ALT SERPL-CCNC: 14 U/L (ref 10–40)
ANION GAP SERPL CALCULATED.3IONS-SCNC: 12 MMOL/L (ref 3–16)
APTT BLD: 30.1 SEC (ref 22.8–35.8)
AST SERPL-CCNC: 14 U/L (ref 15–37)
BACTERIA URNS QL MICRO: ABNORMAL /HPF
BASOPHILS # BLD: 0 K/UL (ref 0–0.2)
BASOPHILS NFR BLD: 0.5 %
BILIRUB SERPL-MCNC: 0.6 MG/DL (ref 0–1)
BILIRUB UR QL STRIP.AUTO: NEGATIVE
BUN SERPL-MCNC: 5 MG/DL (ref 7–20)
CALCIUM SERPL-MCNC: 9.3 MG/DL (ref 8.3–10.6)
CHLORIDE SERPL-SCNC: 101 MMOL/L (ref 99–110)
CLARITY UR: CLEAR
CO2 SERPL-SCNC: 23 MMOL/L (ref 21–32)
COLOR UR: YELLOW
CREAT SERPL-MCNC: 0.7 MG/DL (ref 0.6–1.1)
DEPRECATED RDW RBC AUTO: 13.7 % (ref 12.4–15.4)
EOSINOPHIL # BLD: 0.4 K/UL (ref 0–0.6)
EOSINOPHIL NFR BLD: 6.1 %
EPI CELLS #/AREA URNS HPF: ABNORMAL /HPF (ref 0–5)
GFR SERPLBLD CREATININE-BSD FMLA CKD-EPI: >90 ML/MIN/{1.73_M2}
GLUCOSE SERPL-MCNC: 93 MG/DL (ref 70–99)
GLUCOSE UR STRIP.AUTO-MCNC: NEGATIVE MG/DL
HCG SERPL QL: NEGATIVE
HCT VFR BLD AUTO: 38.2 % (ref 36–48)
HGB BLD-MCNC: 13 G/DL (ref 12–16)
HGB UR QL STRIP.AUTO: ABNORMAL
INR PPP: 1.03 (ref 0.86–1.14)
KETONES UR STRIP.AUTO-MCNC: 15 MG/DL
LACTATE BLDV-SCNC: 0.9 MMOL/L (ref 0.4–1.9)
LEUKOCYTE ESTERASE UR QL STRIP.AUTO: ABNORMAL
LIPASE SERPL-CCNC: 17 U/L (ref 13–60)
LYMPHOCYTES # BLD: 1.8 K/UL (ref 1–5.1)
LYMPHOCYTES NFR BLD: 26.2 %
MCH RBC QN AUTO: 32.5 PG (ref 26–34)
MCHC RBC AUTO-ENTMCNC: 34 G/DL (ref 31–36)
MCV RBC AUTO: 95.7 FL (ref 80–100)
MONOCYTES # BLD: 0.6 K/UL (ref 0–1.3)
MONOCYTES NFR BLD: 9 %
MUCOUS THREADS #/AREA URNS LPF: ABNORMAL /LPF
NEUTROPHILS # BLD: 4.1 K/UL (ref 1.7–7.7)
NEUTROPHILS NFR BLD: 58.2 %
NITRITE UR QL STRIP.AUTO: NEGATIVE
PH UR STRIP.AUTO: 7.5 [PH] (ref 5–8)
PLATELET # BLD AUTO: 218 K/UL (ref 135–450)
PMV BLD AUTO: 8.2 FL (ref 5–10.5)
POTASSIUM SERPL-SCNC: 3.7 MMOL/L (ref 3.5–5.1)
PROT SERPL-MCNC: 6.2 G/DL (ref 6.4–8.2)
PROT UR STRIP.AUTO-MCNC: ABNORMAL MG/DL
PROTHROMBIN TIME: 13.8 SEC (ref 12.1–14.9)
RBC # BLD AUTO: 3.99 M/UL (ref 4–5.2)
RBC #/AREA URNS HPF: ABNORMAL /HPF (ref 0–4)
SODIUM SERPL-SCNC: 136 MMOL/L (ref 136–145)
SP GR UR STRIP.AUTO: 1.01 (ref 1–1.03)
UA COMPLETE W REFLEX CULTURE PNL UR: ABNORMAL
UA DIPSTICK W REFLEX MICRO PNL UR: YES
URN SPEC COLLECT METH UR: ABNORMAL
UROBILINOGEN UR STRIP-ACNC: 0.2 E.U./DL
WBC # BLD AUTO: 7.1 K/UL (ref 4–11)
WBC #/AREA URNS HPF: ABNORMAL /HPF (ref 0–5)

## 2025-07-12 PROCEDURE — 36415 COLL VENOUS BLD VENIPUNCTURE: CPT

## 2025-07-12 PROCEDURE — 85025 COMPLETE CBC W/AUTO DIFF WBC: CPT

## 2025-07-12 PROCEDURE — 96375 TX/PRO/DX INJ NEW DRUG ADDON: CPT

## 2025-07-12 PROCEDURE — 85610 PROTHROMBIN TIME: CPT

## 2025-07-12 PROCEDURE — 80053 COMPREHEN METABOLIC PANEL: CPT

## 2025-07-12 PROCEDURE — 85730 THROMBOPLASTIN TIME PARTIAL: CPT

## 2025-07-12 PROCEDURE — 81001 URINALYSIS AUTO W/SCOPE: CPT

## 2025-07-12 PROCEDURE — 84703 CHORIONIC GONADOTROPIN ASSAY: CPT

## 2025-07-12 PROCEDURE — 74177 CT ABD & PELVIS W/CONTRAST: CPT

## 2025-07-12 PROCEDURE — 99285 EMERGENCY DEPT VISIT HI MDM: CPT

## 2025-07-12 PROCEDURE — 83690 ASSAY OF LIPASE: CPT

## 2025-07-12 PROCEDURE — 6360000002 HC RX W HCPCS

## 2025-07-12 PROCEDURE — 83605 ASSAY OF LACTIC ACID: CPT

## 2025-07-12 PROCEDURE — 96374 THER/PROPH/DIAG INJ IV PUSH: CPT

## 2025-07-12 PROCEDURE — 6360000004 HC RX CONTRAST MEDICATION

## 2025-07-12 RX ORDER — MORPHINE SULFATE 4 MG/ML
6 INJECTION, SOLUTION INTRAMUSCULAR; INTRAVENOUS ONCE
Status: COMPLETED | OUTPATIENT
Start: 2025-07-12 | End: 2025-07-12

## 2025-07-12 RX ORDER — IOPAMIDOL 755 MG/ML
75 INJECTION, SOLUTION INTRAVASCULAR
Status: COMPLETED | OUTPATIENT
Start: 2025-07-12 | End: 2025-07-12

## 2025-07-12 RX ORDER — KETOROLAC TROMETHAMINE 15 MG/ML
30 INJECTION, SOLUTION INTRAMUSCULAR; INTRAVENOUS ONCE
Status: COMPLETED | OUTPATIENT
Start: 2025-07-13 | End: 2025-07-13

## 2025-07-12 RX ORDER — ONDANSETRON 2 MG/ML
4 INJECTION INTRAMUSCULAR; INTRAVENOUS ONCE
Status: COMPLETED | OUTPATIENT
Start: 2025-07-12 | End: 2025-07-12

## 2025-07-12 RX ORDER — OXYCODONE HYDROCHLORIDE 5 MG/1
10 TABLET ORAL ONCE
Refills: 0 | Status: COMPLETED | OUTPATIENT
Start: 2025-07-13 | End: 2025-07-13

## 2025-07-12 RX ADMIN — MORPHINE SULFATE 6 MG: 4 INJECTION, SOLUTION INTRAMUSCULAR; INTRAVENOUS at 20:22

## 2025-07-12 RX ADMIN — IOPAMIDOL 75 ML: 755 INJECTION, SOLUTION INTRAVENOUS at 21:05

## 2025-07-12 RX ADMIN — ONDANSETRON 4 MG: 2 INJECTION, SOLUTION INTRAMUSCULAR; INTRAVENOUS at 20:23

## 2025-07-12 ASSESSMENT — PAIN SCALES - GENERAL
PAINLEVEL_OUTOF10: 10
PAINLEVEL_OUTOF10: 8

## 2025-07-12 ASSESSMENT — PAIN DESCRIPTION - LOCATION: LOCATION: FLANK

## 2025-07-12 ASSESSMENT — PAIN DESCRIPTION - ORIENTATION: ORIENTATION: RIGHT;LEFT

## 2025-07-12 ASSESSMENT — PAIN - FUNCTIONAL ASSESSMENT: PAIN_FUNCTIONAL_ASSESSMENT: 0-10

## 2025-07-12 ASSESSMENT — PAIN DESCRIPTION - DESCRIPTORS: DESCRIPTORS: SHARP

## 2025-07-12 ASSESSMENT — LIFESTYLE VARIABLES
HOW MANY STANDARD DRINKS CONTAINING ALCOHOL DO YOU HAVE ON A TYPICAL DAY: PATIENT DOES NOT DRINK
HOW OFTEN DO YOU HAVE A DRINK CONTAINING ALCOHOL: NEVER

## 2025-07-13 VITALS
DIASTOLIC BLOOD PRESSURE: 78 MMHG | WEIGHT: 170.1 LBS | HEART RATE: 75 BPM | HEIGHT: 69 IN | OXYGEN SATURATION: 95 % | RESPIRATION RATE: 16 BRPM | SYSTOLIC BLOOD PRESSURE: 131 MMHG | BODY MASS INDEX: 25.2 KG/M2 | TEMPERATURE: 99.9 F

## 2025-07-13 PROCEDURE — 6370000000 HC RX 637 (ALT 250 FOR IP)

## 2025-07-13 PROCEDURE — 96375 TX/PRO/DX INJ NEW DRUG ADDON: CPT

## 2025-07-13 PROCEDURE — 6360000002 HC RX W HCPCS

## 2025-07-13 RX ADMIN — OXYCODONE 10 MG: 5 TABLET ORAL at 00:07

## 2025-07-13 RX ADMIN — KETOROLAC TROMETHAMINE 30 MG: 15 INJECTION, SOLUTION INTRAMUSCULAR; INTRAVENOUS at 00:06

## 2025-07-13 RX ADMIN — NALOXEGOL OXALATE 12.5 MG: 12.5 TABLET, FILM COATED ORAL at 00:08

## 2025-07-13 ASSESSMENT — PAIN DESCRIPTION - DESCRIPTORS
DESCRIPTORS: SHARP
DESCRIPTORS: SHARP

## 2025-07-13 ASSESSMENT — PAIN DESCRIPTION - LOCATION
LOCATION: FLANK
LOCATION: FLANK

## 2025-07-13 ASSESSMENT — PAIN DESCRIPTION - ORIENTATION
ORIENTATION: RIGHT;LEFT
ORIENTATION: RIGHT;LEFT

## 2025-07-13 ASSESSMENT — PAIN SCALES - GENERAL
PAINLEVEL_OUTOF10: 8
PAINLEVEL_OUTOF10: 8

## 2025-07-13 NOTE — ED PROVIDER NOTES
[facility] EMERGENCY DEPARTMENT     EMERGENCY DEPARTMENT ENCOUNTER            Pt Name: Jania Patel   MRN: [id number]   Birthdate 1985   Date of evaluation: 7/12/2025   Provider: Lamar Gordon MD   PCP: Kyleigh Hedrick APRN - CNP   Note Started: 11:29 PM EDT 7/12/25          CHIEF COMPLAINT     Chief Complaint   Patient presents with    Flank Pain     Pt. Reports L flank pain, pt. Reports her page catheter keeps getting clogged. Pt. Was seen on Thursday for same problem.              HISTORY OF PRESENT ILLNESS:   History from : Patient   Limitations to history : None     Jania Patel is a 40 y.o. female with recent hysterectomy 5 days prior, with complication of left ureteral injury.  Patient has left stent placed.  Patient presents today with severe lower abdominal pain.  Patient states that pain has not been controlled with oxycodone at home, patient has also had intermittent difficulty with drainage of her Page catheter.  Also endorsing back pain, chills, dizziness.  Denies any chest pain.  Patient also endorsing lower extremity edema.  Denies any shortness of breath.    Nursing Notes were all reviewed and agreed with, or any disagreements were addressed in the HPI.     REVIEW OF SYSTEMS :    Positives and Pertinent negatives as per HPI.      MEDICAL HISTORY   has a past medical history of Chronic back pain, GERD (gastroesophageal reflux disease), Malignant melanoma (2005), Painful orthopaedic hardware (06/11/2024), and Wears glasses.    Past Surgical History:   Procedure Laterality Date    ANKLE SURGERY Right 06/17/2024    RIGHT MIDFOOT HARDWARE REMOVAL performed by Lalo Gupta MD at [facility] OR    BREAST BIOPSY      DILATION AND CURETTAGE OF UTERUS      16 years old. miscarried and had d&c    DILATION AND CURETTAGE OF UTERUS  09/14/2018    FOOT FRACTURE SURGERY Right 09/03/2020    OPEN REDUCTION INTERNAL FIXATION RIGHT LISFRANC FRACTURE DISLOCATION WITH MINI C-ARM AND BLOCK FOR PAIN

## 2025-07-13 NOTE — DISCHARGE INSTRUCTIONS
As discussed in terms of pain control you can take 5 mg of oxycodone every 4 hours or 10 mg every 6 hours.  Please also take the ibuprofen and Tylenol.    Please return to the emergency room if any worsening pain, fevers or chills.

## 2025-07-18 ENCOUNTER — HOSPITAL ENCOUNTER (OUTPATIENT)
Dept: GENERAL RADIOLOGY | Age: 40
Discharge: HOME | End: 2025-07-18
Payer: COMMERCIAL

## 2025-07-18 ENCOUNTER — HOSPITAL ENCOUNTER (OUTPATIENT)
Dept: INTERVENTIONAL RADIOLOGY/VASCULAR | Age: 40
Discharge: HOME | End: 2025-07-18
Payer: COMMERCIAL

## 2025-07-18 DIAGNOSIS — S37.10XA RIGHT URETERAL INJURY, INITIAL ENCOUNTER: ICD-10-CM

## 2025-07-18 PROCEDURE — 76000 FLUOROSCOPY <1 HR PHYS/QHP: CPT

## 2025-07-18 NOTE — PROGRESS NOTES
Patient arrived for IR Cystrography. Mckenna catheter in place on arrival. 250 ML contrast injected into bladder. Per Dr. Conteh, no leaks present. Contrast drained out by Mckenna. Images taken after contrast drained completely. Per Dr. Gil's order Mckenna catheter removed. Patient tolerated procedure well. No complications noted. Patient ambulated to lobby without assistance. Call placed to Tram FAY with Urology group to inform of the above.

## 2025-08-08 ENCOUNTER — TRANSCRIBE ORDERS (OUTPATIENT)
Dept: ADMINISTRATIVE | Age: 40
End: 2025-08-08

## 2025-08-08 DIAGNOSIS — S37.10XA LEFT URETERAL INJURY, INITIAL ENCOUNTER: Primary | ICD-10-CM

## 2025-08-19 ENCOUNTER — ANESTHESIA EVENT (OUTPATIENT)
Dept: OPERATING ROOM | Age: 40
End: 2025-08-19
Payer: COMMERCIAL

## 2025-08-19 ENCOUNTER — ANESTHESIA (OUTPATIENT)
Dept: OPERATING ROOM | Age: 40
End: 2025-08-19
Payer: COMMERCIAL

## 2025-08-19 ENCOUNTER — HOSPITAL ENCOUNTER (OUTPATIENT)
Age: 40
Setting detail: OUTPATIENT SURGERY
Discharge: HOME | End: 2025-08-19
Attending: UROLOGY | Admitting: UROLOGY
Payer: COMMERCIAL

## 2025-08-19 VITALS
HEART RATE: 59 BPM | TEMPERATURE: 98 F | BODY MASS INDEX: 24.29 KG/M2 | SYSTOLIC BLOOD PRESSURE: 109 MMHG | OXYGEN SATURATION: 98 % | WEIGHT: 164 LBS | DIASTOLIC BLOOD PRESSURE: 52 MMHG | HEIGHT: 69 IN | RESPIRATION RATE: 16 BRPM

## 2025-08-19 PROCEDURE — 2709999900 HC NON-CHARGEABLE SUPPLY: Performed by: UROLOGY

## 2025-08-19 PROCEDURE — 7100000011 HC PHASE II RECOVERY - ADDTL 15 MIN: Performed by: UROLOGY

## 2025-08-19 PROCEDURE — 3700000000 HC ANESTHESIA ATTENDED CARE: Performed by: UROLOGY

## 2025-08-19 PROCEDURE — 7100000010 HC PHASE II RECOVERY - FIRST 15 MIN: Performed by: UROLOGY

## 2025-08-19 PROCEDURE — 3700000001 HC ADD 15 MINUTES (ANESTHESIA): Performed by: UROLOGY

## 2025-08-19 PROCEDURE — 2500000003 HC RX 250 WO HCPCS: Performed by: UROLOGY

## 2025-08-19 PROCEDURE — 6360000002 HC RX W HCPCS: Performed by: NURSE ANESTHETIST, CERTIFIED REGISTERED

## 2025-08-19 PROCEDURE — 3600000014 HC SURGERY LEVEL 4 ADDTL 15MIN: Performed by: UROLOGY

## 2025-08-19 PROCEDURE — 3600000004 HC SURGERY LEVEL 4 BASE: Performed by: UROLOGY

## 2025-08-19 RX ORDER — SODIUM CHLORIDE 9 MG/ML
INJECTION, SOLUTION INTRAVENOUS PRN
Status: CANCELLED | OUTPATIENT
Start: 2025-08-19

## 2025-08-19 RX ORDER — DIPHENHYDRAMINE HYDROCHLORIDE 50 MG/ML
12.5 INJECTION, SOLUTION INTRAMUSCULAR; INTRAVENOUS
Status: CANCELLED | OUTPATIENT
Start: 2025-08-19

## 2025-08-19 RX ORDER — SODIUM CHLORIDE 0.9 % (FLUSH) 0.9 %
5-40 SYRINGE (ML) INJECTION EVERY 12 HOURS SCHEDULED
Status: CANCELLED | OUTPATIENT
Start: 2025-08-19

## 2025-08-19 RX ORDER — MIDAZOLAM HYDROCHLORIDE 1 MG/ML
2 INJECTION, SOLUTION INTRAMUSCULAR; INTRAVENOUS
Status: CANCELLED | OUTPATIENT
Start: 2025-08-19

## 2025-08-19 RX ORDER — FENTANYL CITRATE 50 UG/ML
INJECTION, SOLUTION INTRAMUSCULAR; INTRAVENOUS
Status: DISCONTINUED | OUTPATIENT
Start: 2025-08-19 | End: 2025-08-19 | Stop reason: SDUPTHER

## 2025-08-19 RX ORDER — PROPOFOL 10 MG/ML
INJECTION, EMULSION INTRAVENOUS
Status: DISCONTINUED | OUTPATIENT
Start: 2025-08-19 | End: 2025-08-19 | Stop reason: SDUPTHER

## 2025-08-19 RX ORDER — PROCHLORPERAZINE EDISYLATE 5 MG/ML
5 INJECTION INTRAMUSCULAR; INTRAVENOUS
Status: CANCELLED | OUTPATIENT
Start: 2025-08-19

## 2025-08-19 RX ORDER — KETOROLAC TROMETHAMINE 30 MG/ML
INJECTION, SOLUTION INTRAMUSCULAR; INTRAVENOUS
Status: DISCONTINUED | OUTPATIENT
Start: 2025-08-19 | End: 2025-08-19 | Stop reason: SDUPTHER

## 2025-08-19 RX ORDER — LABETALOL HYDROCHLORIDE 5 MG/ML
10 INJECTION, SOLUTION INTRAVENOUS
Status: CANCELLED | OUTPATIENT
Start: 2025-08-19

## 2025-08-19 RX ORDER — IPRATROPIUM BROMIDE AND ALBUTEROL SULFATE 2.5; .5 MG/3ML; MG/3ML
1 SOLUTION RESPIRATORY (INHALATION)
Status: CANCELLED | OUTPATIENT
Start: 2025-08-19

## 2025-08-19 RX ORDER — ONDANSETRON 2 MG/ML
4 INJECTION INTRAMUSCULAR; INTRAVENOUS
Status: CANCELLED | OUTPATIENT
Start: 2025-08-19

## 2025-08-19 RX ORDER — SODIUM CHLORIDE 0.9 % (FLUSH) 0.9 %
5-40 SYRINGE (ML) INJECTION PRN
Status: CANCELLED | OUTPATIENT
Start: 2025-08-19

## 2025-08-19 RX ORDER — SODIUM CHLORIDE 0.9 % (FLUSH) 0.9 %
5-40 SYRINGE (ML) INJECTION PRN
Status: DISCONTINUED | OUTPATIENT
Start: 2025-08-19 | End: 2025-08-19 | Stop reason: HOSPADM

## 2025-08-19 RX ORDER — LIDOCAINE HYDROCHLORIDE 10 MG/ML
0.3 INJECTION, SOLUTION EPIDURAL; INFILTRATION; INTRACAUDAL; PERINEURAL
Status: DISCONTINUED | OUTPATIENT
Start: 2025-08-19 | End: 2025-08-19 | Stop reason: HOSPADM

## 2025-08-19 RX ORDER — SODIUM CHLORIDE 0.9 % (FLUSH) 0.9 %
5-40 SYRINGE (ML) INJECTION EVERY 12 HOURS SCHEDULED
Status: DISCONTINUED | OUTPATIENT
Start: 2025-08-19 | End: 2025-08-19 | Stop reason: HOSPADM

## 2025-08-19 RX ORDER — LIDOCAINE HYDROCHLORIDE 20 MG/ML
INJECTION, SOLUTION INFILTRATION; PERINEURAL
Status: DISCONTINUED | OUTPATIENT
Start: 2025-08-19 | End: 2025-08-19 | Stop reason: SDUPTHER

## 2025-08-19 RX ORDER — SODIUM CHLORIDE 9 MG/ML
INJECTION, SOLUTION INTRAVENOUS PRN
Status: DISCONTINUED | OUTPATIENT
Start: 2025-08-19 | End: 2025-08-19 | Stop reason: HOSPADM

## 2025-08-19 RX ORDER — SODIUM CHLORIDE, SODIUM LACTATE, POTASSIUM CHLORIDE, CALCIUM CHLORIDE 600; 310; 30; 20 MG/100ML; MG/100ML; MG/100ML; MG/100ML
INJECTION, SOLUTION INTRAVENOUS CONTINUOUS
Status: DISCONTINUED | OUTPATIENT
Start: 2025-08-19 | End: 2025-08-19 | Stop reason: HOSPADM

## 2025-08-19 RX ORDER — OXYCODONE HYDROCHLORIDE 5 MG/1
5 TABLET ORAL
Refills: 0 | Status: CANCELLED | OUTPATIENT
Start: 2025-08-19

## 2025-08-19 RX ADMIN — FENTANYL CITRATE 100 MCG: 50 INJECTION INTRAMUSCULAR; INTRAVENOUS at 12:02

## 2025-08-19 RX ADMIN — PROPOFOL 200 MCG/KG/MIN: 10 INJECTION, EMULSION INTRAVENOUS at 12:03

## 2025-08-19 RX ADMIN — PROPOFOL 150 MG: 10 INJECTION, EMULSION INTRAVENOUS at 12:02

## 2025-08-19 RX ADMIN — LIDOCAINE HYDROCHLORIDE 100 MG: 20 INJECTION, SOLUTION INFILTRATION; PERINEURAL at 12:02

## 2025-08-19 RX ADMIN — KETOROLAC TROMETHAMINE 30 MG: 30 INJECTION, SOLUTION INTRAMUSCULAR at 12:17

## 2025-08-19 ASSESSMENT — PAIN - FUNCTIONAL ASSESSMENT
PAIN_FUNCTIONAL_ASSESSMENT: 0-10
PAIN_FUNCTIONAL_ASSESSMENT: 0-10

## 2025-09-04 ENCOUNTER — HOSPITAL ENCOUNTER (OUTPATIENT)
Dept: GENERAL RADIOLOGY | Age: 40
Discharge: HOME | End: 2025-09-04
Payer: COMMERCIAL

## 2025-09-04 DIAGNOSIS — S16.1XXA STRAIN OF CERVICAL PORTION OF TRAPEZIUS MUSCLE: ICD-10-CM

## 2025-09-04 PROCEDURE — 72040 X-RAY EXAM NECK SPINE 2-3 VW: CPT

## (undated) DEVICE — BOWL,STERILE,LARGE,32 OZ: Brand: MEDLINE

## (undated) DEVICE — PROGRASP FORCEPS: Brand: ENDOWRIST

## (undated) DEVICE — SUTURE VICRYL + SZ 3-0 L18IN ABSRB UD SH 1/2 CIR TAPERCUT NDL VCP864D

## (undated) DEVICE — SUTURE PDS II SZ 3-0 L27IN ABSRB VLT CT-2 L26MM 1/2 CIR Z332H

## (undated) DEVICE — SYRINGE MED 10ML LUERLOCK TIP W/O SFTY DISP

## (undated) DEVICE — ARM DRAPE

## (undated) DEVICE — CATHETER,URETHRAL,REDRUBBER,STRL,16FR: Brand: MEDLINE

## (undated) DEVICE — GLOVE ORANGE PI 8   MSG9080

## (undated) DEVICE — PUMP SUC IRR TBNG L10FT W/ HNDPC ASSEMB STRYKEFLOW 2

## (undated) DEVICE — SUTURE MONOCRYL + SZ 4-0 L18IN ABSRB UD L19MM PS-2 3/8 CIR MCP496G

## (undated) DEVICE — AIRSEAL 8 MM ACCESS PORT AND LOW PROFILE OBTURATOR WITH BLADELESS OPTICAL TIP, 120 MM LENGTH: Brand: AIRSEAL

## (undated) DEVICE — SOLUTION IV 1000ML LAC RINGERS PH 6.5 INJ USP VIAFLX PLAS

## (undated) DEVICE — FENESTRATED BIPOLAR FORCEPS: Brand: ENDOWRIST

## (undated) DEVICE — SOLUTION IV STRL H2O 1000 ML INJ USP EXCEL CONTAINER

## (undated) DEVICE — GUIDEWIRE UROLOGICAL STR STD 0.035 IN X150 CM (QTY = BOX OF 5)

## (undated) DEVICE — MEGA SUTURECUT ND: Brand: ENDOWRIST

## (undated) DEVICE — LARGE NEEDLE DRIVER: Brand: ENDOWRIST

## (undated) DEVICE — GAUZE,SPONGE,4"X4",8PLY,STRL,LF,10/TRAY: Brand: MEDLINE

## (undated) DEVICE — BARRIER ADH W3XL4IN UTER PELV ABSRB GYNECARE INTCEED

## (undated) DEVICE — SUTURE VICRYL + SZ 2-0 L27IN ABSRB VLT SH 1/2 CIR TAPERPOINT VCP317H

## (undated) DEVICE — SEAL

## (undated) DEVICE — MHCZ GYN: Brand: MEDLINE INDUSTRIES, INC.

## (undated) DEVICE — STRIP,CLOSURE,WOUND,MEDI-STRIP,1/2X4: Brand: MEDLINE

## (undated) DEVICE — SOLUTION IV 1000 ML 0.9 NACL INJ USP EXCEL PLAS CONTAINER

## (undated) DEVICE — MASTISOL ADHESIVE LIQ 2/3ML

## (undated) DEVICE — SOLUTION SODIUM CHL 0.9% 500 ML IRRI BTL

## (undated) DEVICE — TIP COVER ACCESSORY

## (undated) DEVICE — INSUFFLATION NEEDLE TO ESTABLISH PNEUMOPERITONEUM.: Brand: INSUFFLATION NEEDLE

## (undated) DEVICE — SOLUTION IRRIG 1000ML STRL H2O USP PLAS POUR BTL

## (undated) DEVICE — SOLUTION IRRIGATION STRL H2O 1000 ML UROMATIC CONTAINER

## (undated) DEVICE — SUTURE STRATAFIX SPRL PDS + SZ 0 L9IN ABSRB VLT CT-1 L36MM SXPP1B455

## (undated) DEVICE — CATHETER URETH 16FR BLLN 5CC STD LTX 3 W TWO OPP DRNGE EYE

## (undated) DEVICE — 40585 XL ADVANCED TRENDELENBURG POSITIONING KIT: Brand: 40585 XL ADVANCED TRENDELENBURG POSITIONING KIT

## (undated) DEVICE — CATHETER,FOLEY,100%SILICONE,20FR,10ML,LF: Brand: MEDLINE

## (undated) DEVICE — CANNULA NSL 13FT TUBE AD ETCO2 DIV SAMP M

## (undated) DEVICE — DRESSING,GAUZE,XEROFORM,CURAD,1"X8",ST: Brand: CURAD

## (undated) DEVICE — BLADELESS OBTURATOR: Brand: WECK VISTA

## (undated) DEVICE — 3M™ IOBAN™ 2 ANTIMICROBIAL INCISE DRAPE 6650EZ: Brand: IOBAN™ 2

## (undated) DEVICE — MHCZ ROBOT: Brand: MEDLINE INDUSTRIES, INC.

## (undated) DEVICE — LIQUIBAND RAPID ADHESIVE 36/CS 0.8ML: Brand: MEDLINE

## (undated) DEVICE — TROCAR: Brand: KII OPTICAL ACCESS SYSTEM

## (undated) DEVICE — TRI-LUMEN FILTERED TUBE SET WITH ACTIVATED CHARCOAL FILTER: Brand: AIRSEAL

## (undated) DEVICE — SYRINGE IRRIG 60ML SFT PLIABLE BLB EZ TO GRP 1 HND USE W/

## (undated) DEVICE — VCARE MEDIUM, UTERINE MANIPULATOR, VAGINAL-CERVICAL-AHLUWALIA'S-RETRACTOR-ELEVATOR: Brand: VCARE

## (undated) DEVICE — INTENDED FOR TISSUE SEPARATION, AND OTHER PROCEDURES THAT REQUIRE A SHARP SURGICAL BLADE TO PUNCTURE OR CUT.: Brand: BARD-PARKER ® CARBON RIB-BACK BLADES

## (undated) DEVICE — GOWN SIRUS NONREIN XL W/TWL: Brand: MEDLINE INDUSTRIES, INC.

## (undated) DEVICE — PACK PROCEDURE SURG EXTREMITY SORGER CDS

## (undated) DEVICE — CO2 INSUFFLATION NEEDLE: Brand: CORE DYNAMICS

## (undated) DEVICE — VESSEL SEALER EXTEND: Brand: ENDOWRIST

## (undated) DEVICE — GAUZE,SPONGE,4"X4",16PLY,XRAY,STRL,LF: Brand: MEDLINE

## (undated) DEVICE — Device

## (undated) DEVICE — SKIN AFFIX SURG ADHESIVE 72/CS 0.55ML: Brand: MEDLINE

## (undated) DEVICE — STANDARD HYPODERMIC NEEDLE,POLYPROPYLENE HUB: Brand: MONOJECT

## (undated) DEVICE — SUTURE VICRYL + SZ 3-0 L27IN ABSRB UD L26MM SH 1/2 CIR VCP416H

## (undated) DEVICE — DRAIN SURG 15FR L3/16IN DIA4.7MM SIL CHN RND HUBLESS FULL

## (undated) DEVICE — SCISSORS SURG DIA8MM MPLR CRV ENDOWRIST

## (undated) DEVICE — COLUMN DRAPE

## (undated) DEVICE — GLOVE ORANGE PI 7 1/2   MSG9075

## (undated) DEVICE — BAG URIN STRL FOR URO CTCH SYS

## (undated) DEVICE — SUTURE VICRYL + SZ 4-0 L27IN ABSRB VLT RB-1 1/2 CIR VCP304H

## (undated) DEVICE — KIT EVAC 100CC W10MMXL20CM SIL FULL PERF HUBLESS FLAT DRN

## (undated) DEVICE — Y-TYPE TUR/BLADDER IRRIGATION SET, REGULATING CLAMP

## (undated) DEVICE — PADDING UNDERCAST W4INXL4YD 100% COT CRIMPED FINISH WBRL II

## (undated) DEVICE — SUTURE VICRYL + SZ 2-0 L18IN ABSRB UD CT1 L36MM 1/2 CIR VCP839D

## (undated) DEVICE — SUTURE PDS II SZ 4-0 L27IN ABSRB VLT L17MM RB-1 1/2 CIR Z304H

## (undated) DEVICE — GLOVE ORANGE PI 7   MSG9070

## (undated) DEVICE — PAD,ABDOMINAL,8"X10",ST,LF: Brand: MEDLINE

## (undated) DEVICE — GLOVE SURG SZ 65 L12IN FNGR THK126MIL CRM LTX FREE

## (undated) DEVICE — SUTURE VICRYL + SZ 0 L18IN ABSRB CLR VCP112G

## (undated) DEVICE — CATHETER URET 5FR L70CM OPN END SGL LUMN INJ HUB FLEXIMA

## (undated) DEVICE — TUBING SUCT L10FT L0.1875IN CONN STR UNIV W/ RIB FEM CONN

## (undated) DEVICE — 1LYRTR 16FR10ML 100%SILI SNAP: Brand: MEDLINE INDUSTRIES, INC.